# Patient Record
Sex: FEMALE | Race: WHITE | NOT HISPANIC OR LATINO | Employment: OTHER | URBAN - METROPOLITAN AREA
[De-identification: names, ages, dates, MRNs, and addresses within clinical notes are randomized per-mention and may not be internally consistent; named-entity substitution may affect disease eponyms.]

---

## 2022-01-01 ENCOUNTER — APPOINTMENT (OUTPATIENT)
Dept: PHYSICAL THERAPY | Facility: CLINIC | Age: 77
DRG: 193 | End: 2022-01-01
Payer: COMMERCIAL

## 2022-01-01 ENCOUNTER — APPOINTMENT (OUTPATIENT)
Dept: OCCUPATIONAL THERAPY | Facility: CLINIC | Age: 77
DRG: 193 | End: 2022-01-01
Payer: COMMERCIAL

## 2022-01-01 ENCOUNTER — APPOINTMENT (OUTPATIENT)
Dept: OCCUPATIONAL THERAPY | Facility: CLINIC | Age: 77
DRG: 193 | End: 2022-01-01
Attending: INTERNAL MEDICINE
Payer: COMMERCIAL

## 2022-01-01 ENCOUNTER — APPOINTMENT (OUTPATIENT)
Dept: PHYSICAL THERAPY | Facility: CLINIC | Age: 77
DRG: 193 | End: 2022-01-01
Attending: INTERNAL MEDICINE
Payer: COMMERCIAL

## 2022-01-01 ENCOUNTER — LAB REQUISITION (OUTPATIENT)
Dept: LAB | Facility: CLINIC | Age: 77
End: 2022-01-01
Payer: COMMERCIAL

## 2022-01-01 ENCOUNTER — HOSPITAL ENCOUNTER (INPATIENT)
Facility: CLINIC | Age: 77
LOS: 17 days | Discharge: HOME | DRG: 193 | End: 2022-08-04
Attending: EMERGENCY MEDICINE | Admitting: INTERNAL MEDICINE
Payer: COMMERCIAL

## 2022-01-01 ENCOUNTER — APPOINTMENT (OUTPATIENT)
Dept: GENERAL RADIOLOGY | Facility: CLINIC | Age: 77
DRG: 193 | End: 2022-01-01
Attending: EMERGENCY MEDICINE
Payer: COMMERCIAL

## 2022-01-01 VITALS
HEART RATE: 75 BPM | OXYGEN SATURATION: 97 % | WEIGHT: 109.9 LBS | DIASTOLIC BLOOD PRESSURE: 66 MMHG | RESPIRATION RATE: 15 BRPM | BODY MASS INDEX: 21.58 KG/M2 | TEMPERATURE: 98.7 F | HEIGHT: 60 IN | SYSTOLIC BLOOD PRESSURE: 125 MMHG

## 2022-01-01 DIAGNOSIS — D64.9 ANEMIA, UNSPECIFIED TYPE: ICD-10-CM

## 2022-01-01 DIAGNOSIS — R79.9 ABNORMAL FINDING OF BLOOD CHEMISTRY, UNSPECIFIED: ICD-10-CM

## 2022-01-01 DIAGNOSIS — J18.9 PNEUMONIA OF RIGHT LOWER LOBE DUE TO INFECTIOUS ORGANISM: ICD-10-CM

## 2022-01-01 DIAGNOSIS — R68.89 OTHER GENERAL SYMPTOMS AND SIGNS: ICD-10-CM

## 2022-01-01 DIAGNOSIS — Z79.4 LONG TERM CURRENT USE OF INSULIN (H): ICD-10-CM

## 2022-01-01 DIAGNOSIS — R41.0 CONFUSION: Primary | ICD-10-CM

## 2022-01-01 DIAGNOSIS — E10.65 UNCONTROLLED TYPE 1 DIABETES MELLITUS WITH HYPERGLYCEMIA (H): ICD-10-CM

## 2022-01-01 DIAGNOSIS — Z20.822 CONTACT WITH AND (SUSPECTED) EXPOSURE TO COVID-19: ICD-10-CM

## 2022-01-01 LAB
ANION GAP SERPL CALCULATED.3IONS-SCNC: 3 MMOL/L (ref 3–14)
ANION GAP SERPL CALCULATED.3IONS-SCNC: 4 MMOL/L (ref 3–14)
ANION GAP SERPL CALCULATED.3IONS-SCNC: 4 MMOL/L (ref 3–14)
ANION GAP SERPL CALCULATED.3IONS-SCNC: 5 MMOL/L (ref 3–14)
ANION GAP SERPL CALCULATED.3IONS-SCNC: 7 MMOL/L (ref 3–14)
ANION GAP SERPL CALCULATED.3IONS-SCNC: 7 MMOL/L (ref 3–14)
ANION GAP SERPL CALCULATED.3IONS-SCNC: 9 MMOL/L (ref 3–14)
ANION GAP SERPL CALCULATED.3IONS-SCNC: 9 MMOL/L (ref 3–14)
ATRIAL RATE - MUSE: 96 BPM
BASE EXCESS BLDV CALC-SCNC: 3.6 MMOL/L (ref -7.7–1.9)
BASOPHILS # BLD AUTO: 0 10E3/UL (ref 0–0.2)
BASOPHILS # BLD AUTO: 0.1 10E3/UL (ref 0–0.2)
BASOPHILS # BLD MANUAL: 0 10E3/UL (ref 0–0.2)
BASOPHILS # BLD MANUAL: 0 10E3/UL (ref 0–0.2)
BASOPHILS NFR BLD AUTO: 0 %
BASOPHILS NFR BLD AUTO: 0 %
BASOPHILS NFR BLD AUTO: 1 %
BASOPHILS NFR BLD MANUAL: 0 %
BASOPHILS NFR BLD MANUAL: 0 %
BUN SERPL-MCNC: 16 MG/DL (ref 7–30)
BUN SERPL-MCNC: 18 MG/DL (ref 7–30)
BUN SERPL-MCNC: 19 MG/DL (ref 7–30)
BUN SERPL-MCNC: 20 MG/DL (ref 7–30)
BUN SERPL-MCNC: 21 MG/DL (ref 7–30)
BUN SERPL-MCNC: 23 MG/DL (ref 7–30)
BUN SERPL-MCNC: 25 MG/DL (ref 7–30)
BUN SERPL-MCNC: 29 MG/DL (ref 7–30)
BUN SERPL-MCNC: 30 MG/DL (ref 7–30)
BUN SERPL-MCNC: 34 MG/DL (ref 7–30)
BUN SERPL-MCNC: 37 MG/DL (ref 7–30)
CALCIUM SERPL-MCNC: 8.5 MG/DL (ref 8.5–10.1)
CALCIUM SERPL-MCNC: 8.5 MG/DL (ref 8.5–10.1)
CALCIUM SERPL-MCNC: 8.7 MG/DL (ref 8.5–10.1)
CALCIUM SERPL-MCNC: 8.8 MG/DL (ref 8.5–10.1)
CALCIUM SERPL-MCNC: 8.9 MG/DL (ref 8.5–10.1)
CALCIUM SERPL-MCNC: 9.1 MG/DL (ref 8.5–10.1)
CHLORIDE BLD-SCNC: 104 MMOL/L (ref 94–109)
CHLORIDE BLD-SCNC: 107 MMOL/L (ref 94–109)
CHLORIDE BLD-SCNC: 108 MMOL/L (ref 94–109)
CHLORIDE BLD-SCNC: 109 MMOL/L (ref 94–109)
CHLORIDE BLD-SCNC: 110 MMOL/L (ref 94–109)
CHLORIDE BLD-SCNC: 97 MMOL/L (ref 94–109)
CHLORIDE BLD-SCNC: 98 MMOL/L (ref 94–109)
CO2 SERPL-SCNC: 23 MMOL/L (ref 20–32)
CO2 SERPL-SCNC: 25 MMOL/L (ref 20–32)
CO2 SERPL-SCNC: 26 MMOL/L (ref 20–32)
CO2 SERPL-SCNC: 26 MMOL/L (ref 20–32)
CO2 SERPL-SCNC: 28 MMOL/L (ref 20–32)
CO2 SERPL-SCNC: 29 MMOL/L (ref 20–32)
CO2 SERPL-SCNC: 29 MMOL/L (ref 20–32)
CREAT SERPL-MCNC: 0.66 MG/DL (ref 0.52–1.04)
CREAT SERPL-MCNC: 0.68 MG/DL (ref 0.52–1.04)
CREAT SERPL-MCNC: 0.69 MG/DL (ref 0.52–1.04)
CREAT SERPL-MCNC: 0.7 MG/DL (ref 0.52–1.04)
CREAT SERPL-MCNC: 0.7 MG/DL (ref 0.52–1.04)
CREAT SERPL-MCNC: 0.72 MG/DL (ref 0.52–1.04)
CREAT SERPL-MCNC: 0.73 MG/DL (ref 0.52–1.04)
CREAT SERPL-MCNC: 0.73 MG/DL (ref 0.52–1.04)
CREAT SERPL-MCNC: 0.78 MG/DL (ref 0.52–1.04)
CREAT SERPL-MCNC: 1.25 MG/DL (ref 0.52–1.04)
CREAT SERPL-MCNC: 1.32 MG/DL (ref 0.52–1.04)
DIASTOLIC BLOOD PRESSURE - MUSE: NORMAL MMHG
EOSINOPHIL # BLD AUTO: 0.1 10E3/UL (ref 0–0.7)
EOSINOPHIL # BLD AUTO: 0.2 10E3/UL (ref 0–0.7)
EOSINOPHIL # BLD MANUAL: 0 10E3/UL (ref 0–0.7)
EOSINOPHIL # BLD MANUAL: 0.3 10E3/UL (ref 0–0.7)
EOSINOPHIL NFR BLD AUTO: 1 %
EOSINOPHIL NFR BLD AUTO: 2 %
EOSINOPHIL NFR BLD AUTO: 3 %
EOSINOPHIL NFR BLD MANUAL: 0 %
EOSINOPHIL NFR BLD MANUAL: 4 %
ERYTHROCYTE [DISTWIDTH] IN BLOOD BY AUTOMATED COUNT: 12.9 % (ref 10–15)
ERYTHROCYTE [DISTWIDTH] IN BLOOD BY AUTOMATED COUNT: 13.1 % (ref 10–15)
ERYTHROCYTE [DISTWIDTH] IN BLOOD BY AUTOMATED COUNT: 13.2 % (ref 10–15)
ERYTHROCYTE [DISTWIDTH] IN BLOOD BY AUTOMATED COUNT: 13.3 % (ref 10–15)
ERYTHROCYTE [DISTWIDTH] IN BLOOD BY AUTOMATED COUNT: 13.5 % (ref 10–15)
ERYTHROCYTE [DISTWIDTH] IN BLOOD BY AUTOMATED COUNT: 13.8 % (ref 10–15)
ERYTHROCYTE [DISTWIDTH] IN BLOOD BY AUTOMATED COUNT: 14 % (ref 10–15)
ERYTHROCYTE [DISTWIDTH] IN BLOOD BY AUTOMATED COUNT: 14.2 % (ref 10–15)
ERYTHROCYTE [DISTWIDTH] IN BLOOD BY AUTOMATED COUNT: 14.4 % (ref 10–15)
FLUAV RNA SPEC QL NAA+PROBE: NEGATIVE
FLUBV RNA RESP QL NAA+PROBE: NEGATIVE
FRAGMENTS BLD QL SMEAR: SLIGHT
GFR SERPL CREATININE-BSD FRML MDRD: 41 ML/MIN/1.73M2
GFR SERPL CREATININE-BSD FRML MDRD: 44 ML/MIN/1.73M2
GFR SERPL CREATININE-BSD FRML MDRD: 78 ML/MIN/1.73M2
GFR SERPL CREATININE-BSD FRML MDRD: 84 ML/MIN/1.73M2
GFR SERPL CREATININE-BSD FRML MDRD: 84 ML/MIN/1.73M2
GFR SERPL CREATININE-BSD FRML MDRD: 86 ML/MIN/1.73M2
GFR SERPL CREATININE-BSD FRML MDRD: 89 ML/MIN/1.73M2
GFR SERPL CREATININE-BSD FRML MDRD: 90 ML/MIN/1.73M2
GLUCOSE BLD-MCNC: 150 MG/DL (ref 70–99)
GLUCOSE BLD-MCNC: 152 MG/DL (ref 70–99)
GLUCOSE BLD-MCNC: 166 MG/DL (ref 70–99)
GLUCOSE BLD-MCNC: 181 MG/DL (ref 70–99)
GLUCOSE BLD-MCNC: 185 MG/DL (ref 70–99)
GLUCOSE BLD-MCNC: 194 MG/DL (ref 70–99)
GLUCOSE BLD-MCNC: 262 MG/DL (ref 70–99)
GLUCOSE BLD-MCNC: 42 MG/DL (ref 70–99)
GLUCOSE BLD-MCNC: 449 MG/DL (ref 70–99)
GLUCOSE BLD-MCNC: 451 MG/DL (ref 70–99)
GLUCOSE BLD-MCNC: 553 MG/DL (ref 70–99)
GLUCOSE BLDC GLUCOMTR-MCNC: 102 MG/DL (ref 70–99)
GLUCOSE BLDC GLUCOMTR-MCNC: 104 MG/DL (ref 70–99)
GLUCOSE BLDC GLUCOMTR-MCNC: 104 MG/DL (ref 70–99)
GLUCOSE BLDC GLUCOMTR-MCNC: 107 MG/DL (ref 70–99)
GLUCOSE BLDC GLUCOMTR-MCNC: 108 MG/DL (ref 70–99)
GLUCOSE BLDC GLUCOMTR-MCNC: 111 MG/DL (ref 70–99)
GLUCOSE BLDC GLUCOMTR-MCNC: 112 MG/DL (ref 70–99)
GLUCOSE BLDC GLUCOMTR-MCNC: 114 MG/DL (ref 70–99)
GLUCOSE BLDC GLUCOMTR-MCNC: 116 MG/DL (ref 70–99)
GLUCOSE BLDC GLUCOMTR-MCNC: 117 MG/DL (ref 70–99)
GLUCOSE BLDC GLUCOMTR-MCNC: 121 MG/DL (ref 70–99)
GLUCOSE BLDC GLUCOMTR-MCNC: 121 MG/DL (ref 70–99)
GLUCOSE BLDC GLUCOMTR-MCNC: 124 MG/DL (ref 70–99)
GLUCOSE BLDC GLUCOMTR-MCNC: 126 MG/DL (ref 70–99)
GLUCOSE BLDC GLUCOMTR-MCNC: 127 MG/DL (ref 70–99)
GLUCOSE BLDC GLUCOMTR-MCNC: 128 MG/DL (ref 70–99)
GLUCOSE BLDC GLUCOMTR-MCNC: 130 MG/DL (ref 70–99)
GLUCOSE BLDC GLUCOMTR-MCNC: 138 MG/DL (ref 70–99)
GLUCOSE BLDC GLUCOMTR-MCNC: 140 MG/DL (ref 70–99)
GLUCOSE BLDC GLUCOMTR-MCNC: 146 MG/DL (ref 70–99)
GLUCOSE BLDC GLUCOMTR-MCNC: 147 MG/DL (ref 70–99)
GLUCOSE BLDC GLUCOMTR-MCNC: 149 MG/DL (ref 70–99)
GLUCOSE BLDC GLUCOMTR-MCNC: 151 MG/DL (ref 70–99)
GLUCOSE BLDC GLUCOMTR-MCNC: 152 MG/DL (ref 70–99)
GLUCOSE BLDC GLUCOMTR-MCNC: 153 MG/DL (ref 70–99)
GLUCOSE BLDC GLUCOMTR-MCNC: 157 MG/DL (ref 70–99)
GLUCOSE BLDC GLUCOMTR-MCNC: 158 MG/DL (ref 70–99)
GLUCOSE BLDC GLUCOMTR-MCNC: 161 MG/DL (ref 70–99)
GLUCOSE BLDC GLUCOMTR-MCNC: 175 MG/DL (ref 70–99)
GLUCOSE BLDC GLUCOMTR-MCNC: 176 MG/DL (ref 70–99)
GLUCOSE BLDC GLUCOMTR-MCNC: 177 MG/DL (ref 70–99)
GLUCOSE BLDC GLUCOMTR-MCNC: 178 MG/DL (ref 70–99)
GLUCOSE BLDC GLUCOMTR-MCNC: 178 MG/DL (ref 70–99)
GLUCOSE BLDC GLUCOMTR-MCNC: 179 MG/DL (ref 70–99)
GLUCOSE BLDC GLUCOMTR-MCNC: 186 MG/DL (ref 70–99)
GLUCOSE BLDC GLUCOMTR-MCNC: 189 MG/DL (ref 70–99)
GLUCOSE BLDC GLUCOMTR-MCNC: 190 MG/DL (ref 70–99)
GLUCOSE BLDC GLUCOMTR-MCNC: 193 MG/DL (ref 70–99)
GLUCOSE BLDC GLUCOMTR-MCNC: 193 MG/DL (ref 70–99)
GLUCOSE BLDC GLUCOMTR-MCNC: 195 MG/DL (ref 70–99)
GLUCOSE BLDC GLUCOMTR-MCNC: 198 MG/DL (ref 70–99)
GLUCOSE BLDC GLUCOMTR-MCNC: 201 MG/DL (ref 70–99)
GLUCOSE BLDC GLUCOMTR-MCNC: 208 MG/DL (ref 70–99)
GLUCOSE BLDC GLUCOMTR-MCNC: 210 MG/DL (ref 70–99)
GLUCOSE BLDC GLUCOMTR-MCNC: 211 MG/DL (ref 70–99)
GLUCOSE BLDC GLUCOMTR-MCNC: 211 MG/DL (ref 70–99)
GLUCOSE BLDC GLUCOMTR-MCNC: 214 MG/DL (ref 70–99)
GLUCOSE BLDC GLUCOMTR-MCNC: 215 MG/DL (ref 70–99)
GLUCOSE BLDC GLUCOMTR-MCNC: 216 MG/DL (ref 70–99)
GLUCOSE BLDC GLUCOMTR-MCNC: 218 MG/DL (ref 70–99)
GLUCOSE BLDC GLUCOMTR-MCNC: 224 MG/DL (ref 70–99)
GLUCOSE BLDC GLUCOMTR-MCNC: 230 MG/DL (ref 70–99)
GLUCOSE BLDC GLUCOMTR-MCNC: 230 MG/DL (ref 70–99)
GLUCOSE BLDC GLUCOMTR-MCNC: 235 MG/DL (ref 70–99)
GLUCOSE BLDC GLUCOMTR-MCNC: 240 MG/DL (ref 70–99)
GLUCOSE BLDC GLUCOMTR-MCNC: 240 MG/DL (ref 70–99)
GLUCOSE BLDC GLUCOMTR-MCNC: 244 MG/DL (ref 70–99)
GLUCOSE BLDC GLUCOMTR-MCNC: 245 MG/DL (ref 70–99)
GLUCOSE BLDC GLUCOMTR-MCNC: 248 MG/DL (ref 70–99)
GLUCOSE BLDC GLUCOMTR-MCNC: 248 MG/DL (ref 70–99)
GLUCOSE BLDC GLUCOMTR-MCNC: 249 MG/DL (ref 70–99)
GLUCOSE BLDC GLUCOMTR-MCNC: 254 MG/DL (ref 70–99)
GLUCOSE BLDC GLUCOMTR-MCNC: 255 MG/DL (ref 70–99)
GLUCOSE BLDC GLUCOMTR-MCNC: 256 MG/DL (ref 70–99)
GLUCOSE BLDC GLUCOMTR-MCNC: 256 MG/DL (ref 70–99)
GLUCOSE BLDC GLUCOMTR-MCNC: 259 MG/DL (ref 70–99)
GLUCOSE BLDC GLUCOMTR-MCNC: 259 MG/DL (ref 70–99)
GLUCOSE BLDC GLUCOMTR-MCNC: 260 MG/DL (ref 70–99)
GLUCOSE BLDC GLUCOMTR-MCNC: 262 MG/DL (ref 70–99)
GLUCOSE BLDC GLUCOMTR-MCNC: 266 MG/DL (ref 70–99)
GLUCOSE BLDC GLUCOMTR-MCNC: 271 MG/DL (ref 70–99)
GLUCOSE BLDC GLUCOMTR-MCNC: 274 MG/DL (ref 70–99)
GLUCOSE BLDC GLUCOMTR-MCNC: 280 MG/DL (ref 70–99)
GLUCOSE BLDC GLUCOMTR-MCNC: 280 MG/DL (ref 70–99)
GLUCOSE BLDC GLUCOMTR-MCNC: 281 MG/DL (ref 70–99)
GLUCOSE BLDC GLUCOMTR-MCNC: 281 MG/DL (ref 70–99)
GLUCOSE BLDC GLUCOMTR-MCNC: 288 MG/DL (ref 70–99)
GLUCOSE BLDC GLUCOMTR-MCNC: 289 MG/DL (ref 70–99)
GLUCOSE BLDC GLUCOMTR-MCNC: 29 MG/DL (ref 70–99)
GLUCOSE BLDC GLUCOMTR-MCNC: 290 MG/DL (ref 70–99)
GLUCOSE BLDC GLUCOMTR-MCNC: 295 MG/DL (ref 70–99)
GLUCOSE BLDC GLUCOMTR-MCNC: 301 MG/DL (ref 70–99)
GLUCOSE BLDC GLUCOMTR-MCNC: 301 MG/DL (ref 70–99)
GLUCOSE BLDC GLUCOMTR-MCNC: 304 MG/DL (ref 70–99)
GLUCOSE BLDC GLUCOMTR-MCNC: 305 MG/DL (ref 70–99)
GLUCOSE BLDC GLUCOMTR-MCNC: 312 MG/DL (ref 70–99)
GLUCOSE BLDC GLUCOMTR-MCNC: 313 MG/DL (ref 70–99)
GLUCOSE BLDC GLUCOMTR-MCNC: 314 MG/DL (ref 70–99)
GLUCOSE BLDC GLUCOMTR-MCNC: 326 MG/DL (ref 70–99)
GLUCOSE BLDC GLUCOMTR-MCNC: 326 MG/DL (ref 70–99)
GLUCOSE BLDC GLUCOMTR-MCNC: 340 MG/DL (ref 70–99)
GLUCOSE BLDC GLUCOMTR-MCNC: 342 MG/DL (ref 70–99)
GLUCOSE BLDC GLUCOMTR-MCNC: 35 MG/DL (ref 70–99)
GLUCOSE BLDC GLUCOMTR-MCNC: 361 MG/DL (ref 70–99)
GLUCOSE BLDC GLUCOMTR-MCNC: 37 MG/DL (ref 70–99)
GLUCOSE BLDC GLUCOMTR-MCNC: 379 MG/DL (ref 70–99)
GLUCOSE BLDC GLUCOMTR-MCNC: 393 MG/DL (ref 70–99)
GLUCOSE BLDC GLUCOMTR-MCNC: 404 MG/DL (ref 70–99)
GLUCOSE BLDC GLUCOMTR-MCNC: 406 MG/DL (ref 70–99)
GLUCOSE BLDC GLUCOMTR-MCNC: 41 MG/DL (ref 70–99)
GLUCOSE BLDC GLUCOMTR-MCNC: 418 MG/DL (ref 70–99)
GLUCOSE BLDC GLUCOMTR-MCNC: 432 MG/DL (ref 70–99)
GLUCOSE BLDC GLUCOMTR-MCNC: 442 MG/DL (ref 70–99)
GLUCOSE BLDC GLUCOMTR-MCNC: 45 MG/DL (ref 70–99)
GLUCOSE BLDC GLUCOMTR-MCNC: 468 MG/DL (ref 70–99)
GLUCOSE BLDC GLUCOMTR-MCNC: 49 MG/DL (ref 70–99)
GLUCOSE BLDC GLUCOMTR-MCNC: 51 MG/DL (ref 70–99)
GLUCOSE BLDC GLUCOMTR-MCNC: 529 MG/DL (ref 70–99)
GLUCOSE BLDC GLUCOMTR-MCNC: 56 MG/DL (ref 70–99)
GLUCOSE BLDC GLUCOMTR-MCNC: 59 MG/DL (ref 70–99)
GLUCOSE BLDC GLUCOMTR-MCNC: 60 MG/DL (ref 70–99)
GLUCOSE BLDC GLUCOMTR-MCNC: 63 MG/DL (ref 70–99)
GLUCOSE BLDC GLUCOMTR-MCNC: 66 MG/DL (ref 70–99)
GLUCOSE BLDC GLUCOMTR-MCNC: 69 MG/DL (ref 70–99)
GLUCOSE BLDC GLUCOMTR-MCNC: 74 MG/DL (ref 70–99)
GLUCOSE BLDC GLUCOMTR-MCNC: 76 MG/DL (ref 70–99)
GLUCOSE BLDC GLUCOMTR-MCNC: 77 MG/DL (ref 70–99)
GLUCOSE BLDC GLUCOMTR-MCNC: 79 MG/DL (ref 70–99)
GLUCOSE BLDC GLUCOMTR-MCNC: 80 MG/DL (ref 70–99)
GLUCOSE BLDC GLUCOMTR-MCNC: 89 MG/DL (ref 70–99)
HBA1C MFR BLD: 10 % (ref 0–5.6)
HCO3 BLDV-SCNC: 30 MMOL/L (ref 21–28)
HCT VFR BLD AUTO: 24.2 % (ref 35–47)
HCT VFR BLD AUTO: 25.1 % (ref 35–47)
HCT VFR BLD AUTO: 26.1 % (ref 35–47)
HCT VFR BLD AUTO: 26.4 % (ref 35–47)
HCT VFR BLD AUTO: 26.9 % (ref 35–47)
HCT VFR BLD AUTO: 27 % (ref 35–47)
HCT VFR BLD AUTO: 27.1 % (ref 35–47)
HCT VFR BLD AUTO: 27.5 % (ref 35–47)
HCT VFR BLD AUTO: 27.7 % (ref 35–47)
HGB BLD-MCNC: 8.2 G/DL (ref 11.7–15.7)
HGB BLD-MCNC: 8.4 G/DL (ref 11.7–15.7)
HGB BLD-MCNC: 8.4 G/DL (ref 11.7–15.7)
HGB BLD-MCNC: 8.6 G/DL (ref 11.7–15.7)
HGB BLD-MCNC: 8.7 G/DL (ref 11.7–15.7)
HGB BLD-MCNC: 8.8 G/DL (ref 11.7–15.7)
HGB BLD-MCNC: 8.9 G/DL (ref 11.7–15.7)
HOLD SPECIMEN: NORMAL
IMM GRANULOCYTES # BLD: 0 10E3/UL
IMM GRANULOCYTES # BLD: 0.1 10E3/UL
IMM GRANULOCYTES # BLD: 0.2 10E3/UL
IMM GRANULOCYTES # BLD: 0.3 10E3/UL
IMM GRANULOCYTES NFR BLD: 0 %
IMM GRANULOCYTES NFR BLD: 1 %
IMM GRANULOCYTES NFR BLD: 3 %
IMM GRANULOCYTES NFR BLD: 3 %
IMM GRANULOCYTES NFR BLD: 4 %
IMM GRANULOCYTES NFR BLD: 4 %
INTERPRETATION ECG - MUSE: NORMAL
KETONES BLD-SCNC: 0.4 MMOL/L (ref 0–0.6)
LYMPHOCYTES # BLD AUTO: 1 10E3/UL (ref 0.8–5.3)
LYMPHOCYTES # BLD AUTO: 1.4 10E3/UL (ref 0.8–5.3)
LYMPHOCYTES # BLD AUTO: 1.4 10E3/UL (ref 0.8–5.3)
LYMPHOCYTES # BLD AUTO: 1.6 10E3/UL (ref 0.8–5.3)
LYMPHOCYTES # BLD AUTO: 1.6 10E3/UL (ref 0.8–5.3)
LYMPHOCYTES # BLD AUTO: 1.7 10E3/UL (ref 0.8–5.3)
LYMPHOCYTES # BLD MANUAL: 0.7 10E3/UL (ref 0.8–5.3)
LYMPHOCYTES # BLD MANUAL: 1.5 10E3/UL (ref 0.8–5.3)
LYMPHOCYTES NFR BLD AUTO: 15 %
LYMPHOCYTES NFR BLD AUTO: 18 %
LYMPHOCYTES NFR BLD AUTO: 18 %
LYMPHOCYTES NFR BLD AUTO: 21 %
LYMPHOCYTES NFR BLD AUTO: 22 %
LYMPHOCYTES NFR BLD AUTO: 25 %
LYMPHOCYTES NFR BLD MANUAL: 20 %
LYMPHOCYTES NFR BLD MANUAL: 9 %
MCH RBC QN AUTO: 30.1 PG (ref 26.5–33)
MCH RBC QN AUTO: 30.3 PG (ref 26.5–33)
MCH RBC QN AUTO: 30.4 PG (ref 26.5–33)
MCH RBC QN AUTO: 30.5 PG (ref 26.5–33)
MCH RBC QN AUTO: 30.9 PG (ref 26.5–33)
MCH RBC QN AUTO: 30.9 PG (ref 26.5–33)
MCH RBC QN AUTO: 31.3 PG (ref 26.5–33)
MCHC RBC AUTO-ENTMCNC: 31.3 G/DL (ref 31.5–36.5)
MCHC RBC AUTO-ENTMCNC: 32.1 G/DL (ref 31.5–36.5)
MCHC RBC AUTO-ENTMCNC: 32.1 G/DL (ref 31.5–36.5)
MCHC RBC AUTO-ENTMCNC: 32.2 G/DL (ref 31.5–36.5)
MCHC RBC AUTO-ENTMCNC: 32.2 G/DL (ref 31.5–36.5)
MCHC RBC AUTO-ENTMCNC: 32.7 G/DL (ref 31.5–36.5)
MCHC RBC AUTO-ENTMCNC: 32.7 G/DL (ref 31.5–36.5)
MCHC RBC AUTO-ENTMCNC: 33 G/DL (ref 31.5–36.5)
MCHC RBC AUTO-ENTMCNC: 34.7 G/DL (ref 31.5–36.5)
MCV RBC AUTO: 90 FL (ref 78–100)
MCV RBC AUTO: 93 FL (ref 78–100)
MCV RBC AUTO: 93 FL (ref 78–100)
MCV RBC AUTO: 94 FL (ref 78–100)
MCV RBC AUTO: 95 FL (ref 78–100)
MCV RBC AUTO: 96 FL (ref 78–100)
MCV RBC AUTO: 96 FL (ref 78–100)
METAMYELOCYTES # BLD MANUAL: 0.1 10E3/UL
METAMYELOCYTES NFR BLD MANUAL: 1 %
MONOCYTES # BLD AUTO: 0.4 10E3/UL (ref 0–1.3)
MONOCYTES # BLD AUTO: 0.5 10E3/UL (ref 0–1.3)
MONOCYTES # BLD AUTO: 0.6 10E3/UL (ref 0–1.3)
MONOCYTES # BLD MANUAL: 0.3 10E3/UL (ref 0–1.3)
MONOCYTES # BLD MANUAL: 0.5 10E3/UL (ref 0–1.3)
MONOCYTES NFR BLD AUTO: 6 %
MONOCYTES NFR BLD AUTO: 7 %
MONOCYTES NFR BLD AUTO: 7 %
MONOCYTES NFR BLD AUTO: 8 %
MONOCYTES NFR BLD AUTO: 9 %
MONOCYTES NFR BLD AUTO: 9 %
MONOCYTES NFR BLD MANUAL: 4 %
MONOCYTES NFR BLD MANUAL: 7 %
MRSA DNA SPEC QL NAA+PROBE: NEGATIVE
MYELOCYTES # BLD MANUAL: 0.1 10E3/UL
MYELOCYTES # BLD MANUAL: 0.1 10E3/UL
MYELOCYTES NFR BLD MANUAL: 1 %
MYELOCYTES NFR BLD MANUAL: 2 %
NEUTROPHILS # BLD AUTO: 4.5 10E3/UL (ref 1.6–8.3)
NEUTROPHILS # BLD AUTO: 4.5 10E3/UL (ref 1.6–8.3)
NEUTROPHILS # BLD AUTO: 5 10E3/UL (ref 1.6–8.3)
NEUTROPHILS # BLD AUTO: 5 10E3/UL (ref 1.6–8.3)
NEUTROPHILS # BLD AUTO: 5.2 10E3/UL (ref 1.6–8.3)
NEUTROPHILS # BLD AUTO: 5.2 10E3/UL (ref 1.6–8.3)
NEUTROPHILS # BLD MANUAL: 5.2 10E3/UL (ref 1.6–8.3)
NEUTROPHILS # BLD MANUAL: 6 10E3/UL (ref 1.6–8.3)
NEUTROPHILS NFR BLD AUTO: 61 %
NEUTROPHILS NFR BLD AUTO: 64 %
NEUTROPHILS NFR BLD AUTO: 65 %
NEUTROPHILS NFR BLD AUTO: 68 %
NEUTROPHILS NFR BLD AUTO: 68 %
NEUTROPHILS NFR BLD AUTO: 75 %
NEUTROPHILS NFR BLD MANUAL: 70 %
NEUTROPHILS NFR BLD MANUAL: 82 %
NRBC # BLD AUTO: 0 10E3/UL
NRBC BLD AUTO-RTO: 0 /100
NRBC BLD AUTO-RTO: 1 /100
O2/TOTAL GAS SETTING VFR VENT: 21 %
P AXIS - MUSE: 55 DEGREES
PCO2 BLDV: 51 MM HG (ref 40–50)
PH BLDV: 7.38 [PH] (ref 7.32–7.43)
PLAT MORPH BLD: ABNORMAL
PLAT MORPH BLD: ABNORMAL
PLATELET # BLD AUTO: 230 10E3/UL (ref 150–450)
PLATELET # BLD AUTO: 261 10E3/UL (ref 150–450)
PLATELET # BLD AUTO: 265 10E3/UL (ref 150–450)
PLATELET # BLD AUTO: 268 10E3/UL (ref 150–450)
PLATELET # BLD AUTO: 277 10E3/UL (ref 150–450)
PLATELET # BLD AUTO: 290 10E3/UL (ref 150–450)
PLATELET # BLD AUTO: 299 10E3/UL (ref 150–450)
PLATELET # BLD AUTO: 330 10E3/UL (ref 150–450)
PLATELET # BLD AUTO: 334 10E3/UL (ref 150–450)
PLATELET # BLD AUTO: 341 10E3/UL (ref 150–450)
PLATELET # BLD AUTO: 356 10E3/UL (ref 150–450)
PO2 BLDV: 17 MM HG (ref 25–47)
POLYCHROMASIA BLD QL SMEAR: SLIGHT
POTASSIUM BLD-SCNC: 3.7 MMOL/L (ref 3.4–5.3)
POTASSIUM BLD-SCNC: 3.8 MMOL/L (ref 3.4–5.3)
POTASSIUM BLD-SCNC: 3.9 MMOL/L (ref 3.4–5.3)
POTASSIUM BLD-SCNC: 3.9 MMOL/L (ref 3.4–5.3)
POTASSIUM BLD-SCNC: 4 MMOL/L (ref 3.4–5.3)
POTASSIUM BLD-SCNC: 4 MMOL/L (ref 3.4–5.3)
POTASSIUM BLD-SCNC: 4.1 MMOL/L (ref 3.4–5.3)
POTASSIUM BLD-SCNC: 4.2 MMOL/L (ref 3.4–5.3)
POTASSIUM BLD-SCNC: 4.5 MMOL/L (ref 3.4–5.3)
POTASSIUM BLD-SCNC: 4.6 MMOL/L (ref 3.4–5.3)
POTASSIUM BLD-SCNC: 4.6 MMOL/L (ref 3.4–5.3)
PR INTERVAL - MUSE: 146 MS
QRS DURATION - MUSE: 74 MS
QT - MUSE: 350 MS
QTC - MUSE: 442 MS
R AXIS - MUSE: 55 DEGREES
RBC # BLD AUTO: 2.68 10E6/UL (ref 3.8–5.2)
RBC # BLD AUTO: 2.7 10E6/UL (ref 3.8–5.2)
RBC # BLD AUTO: 2.72 10E6/UL (ref 3.8–5.2)
RBC # BLD AUTO: 2.82 10E6/UL (ref 3.8–5.2)
RBC # BLD AUTO: 2.85 10E6/UL (ref 3.8–5.2)
RBC # BLD AUTO: 2.86 10E6/UL (ref 3.8–5.2)
RBC # BLD AUTO: 2.86 10E6/UL (ref 3.8–5.2)
RBC # BLD AUTO: 2.89 10E6/UL (ref 3.8–5.2)
RBC # BLD AUTO: 2.94 10E6/UL (ref 3.8–5.2)
RBC MORPH BLD: ABNORMAL
RBC MORPH BLD: ABNORMAL
RSV RNA SPEC NAA+PROBE: NEGATIVE
SA TARGET DNA: NEGATIVE
SARS-COV-2 RNA RESP QL NAA+PROBE: NEGATIVE
SODIUM SERPL-SCNC: 132 MMOL/L (ref 133–144)
SODIUM SERPL-SCNC: 133 MMOL/L (ref 133–144)
SODIUM SERPL-SCNC: 136 MMOL/L (ref 133–144)
SODIUM SERPL-SCNC: 139 MMOL/L (ref 133–144)
SODIUM SERPL-SCNC: 140 MMOL/L (ref 133–144)
SODIUM SERPL-SCNC: 141 MMOL/L (ref 133–144)
SODIUM SERPL-SCNC: 143 MMOL/L (ref 133–144)
SYSTOLIC BLOOD PRESSURE - MUSE: NORMAL MMHG
T AXIS - MUSE: 39 DEGREES
VENTRICULAR RATE- MUSE: 96 BPM
WBC # BLD AUTO: 6.4 10E3/UL (ref 4–11)
WBC # BLD AUTO: 6.7 10E3/UL (ref 4–11)
WBC # BLD AUTO: 7 10E3/UL (ref 4–11)
WBC # BLD AUTO: 7.3 10E3/UL (ref 4–11)
WBC # BLD AUTO: 7.3 10E3/UL (ref 4–11)
WBC # BLD AUTO: 7.4 10E3/UL (ref 4–11)
WBC # BLD AUTO: 7.5 10E3/UL (ref 4–11)
WBC # BLD AUTO: 7.7 10E3/UL (ref 4–11)
WBC # BLD AUTO: 7.8 10E3/UL (ref 4–11)

## 2022-01-01 PROCEDURE — 97116 GAIT TRAINING THERAPY: CPT | Mod: GP

## 2022-01-01 PROCEDURE — 250N000013 HC RX MED GY IP 250 OP 250 PS 637: Performed by: INTERNAL MEDICINE

## 2022-01-01 PROCEDURE — 200N000002 HC R&B ICU UMMC

## 2022-01-01 PROCEDURE — 999N000157 HC STATISTIC RCP TIME EA 10 MIN

## 2022-01-01 PROCEDURE — 250N000011 HC RX IP 250 OP 636: Performed by: INTERNAL MEDICINE

## 2022-01-01 PROCEDURE — 97110 THERAPEUTIC EXERCISES: CPT | Mod: GP

## 2022-01-01 PROCEDURE — 97535 SELF CARE MNGMENT TRAINING: CPT | Mod: GO

## 2022-01-01 PROCEDURE — 85004 AUTOMATED DIFF WBC COUNT: CPT | Performed by: INTERNAL MEDICINE

## 2022-01-01 PROCEDURE — 99233 SBSQ HOSP IP/OBS HIGH 50: CPT | Performed by: NURSE PRACTITIONER

## 2022-01-01 PROCEDURE — 36415 COLL VENOUS BLD VENIPUNCTURE: CPT | Performed by: INTERNAL MEDICINE

## 2022-01-01 PROCEDURE — 97530 THERAPEUTIC ACTIVITIES: CPT | Mod: GP | Performed by: PHYSICAL THERAPIST

## 2022-01-01 PROCEDURE — 97161 PT EVAL LOW COMPLEX 20 MIN: CPT | Mod: GP | Performed by: PHYSICAL THERAPIST

## 2022-01-01 PROCEDURE — 82565 ASSAY OF CREATININE: CPT | Performed by: INTERNAL MEDICINE

## 2022-01-01 PROCEDURE — 80048 BASIC METABOLIC PNL TOTAL CA: CPT | Performed by: PHYSICIAN ASSISTANT

## 2022-01-01 PROCEDURE — 99232 SBSQ HOSP IP/OBS MODERATE 35: CPT | Performed by: INTERNAL MEDICINE

## 2022-01-01 PROCEDURE — 99233 SBSQ HOSP IP/OBS HIGH 50: CPT | Performed by: INTERNAL MEDICINE

## 2022-01-01 PROCEDURE — 250N000009 HC RX 250: Performed by: PHYSICIAN ASSISTANT

## 2022-01-01 PROCEDURE — 97112 NEUROMUSCULAR REEDUCATION: CPT | Mod: GP

## 2022-01-01 PROCEDURE — 97530 THERAPEUTIC ACTIVITIES: CPT | Mod: GO | Performed by: OCCUPATIONAL THERAPIST

## 2022-01-01 PROCEDURE — 85049 AUTOMATED PLATELET COUNT: CPT | Performed by: INTERNAL MEDICINE

## 2022-01-01 PROCEDURE — 250N000012 HC RX MED GY IP 250 OP 636 PS 637: Performed by: CLINICAL NURSE SPECIALIST

## 2022-01-01 PROCEDURE — 82310 ASSAY OF CALCIUM: CPT | Performed by: INTERNAL MEDICINE

## 2022-01-01 PROCEDURE — 258N000001 HC RX 258: Performed by: PHYSICIAN ASSISTANT

## 2022-01-01 PROCEDURE — 250N000009 HC RX 250: Performed by: CLINICAL NURSE SPECIALIST

## 2022-01-01 PROCEDURE — 99285 EMERGENCY DEPT VISIT HI MDM: CPT | Mod: 25 | Performed by: EMERGENCY MEDICINE

## 2022-01-01 PROCEDURE — 99207 PR NO BILLABLE SERVICE THIS VISIT: CPT | Performed by: INTERNAL MEDICINE

## 2022-01-01 PROCEDURE — 99233 SBSQ HOSP IP/OBS HIGH 50: CPT | Performed by: CLINICAL NURSE SPECIALIST

## 2022-01-01 PROCEDURE — 85025 COMPLETE CBC W/AUTO DIFF WBC: CPT | Performed by: INTERNAL MEDICINE

## 2022-01-01 PROCEDURE — 85027 COMPLETE CBC AUTOMATED: CPT | Performed by: INTERNAL MEDICINE

## 2022-01-01 PROCEDURE — 250N000009 HC RX 250: Performed by: INTERNAL MEDICINE

## 2022-01-01 PROCEDURE — 96365 THER/PROPH/DIAG IV INF INIT: CPT | Performed by: EMERGENCY MEDICINE

## 2022-01-01 PROCEDURE — 97530 THERAPEUTIC ACTIVITIES: CPT | Mod: GP

## 2022-01-01 PROCEDURE — 99231 SBSQ HOSP IP/OBS SF/LOW 25: CPT | Performed by: INTERNAL MEDICINE

## 2022-01-01 PROCEDURE — 85018 HEMOGLOBIN: CPT | Performed by: INTERNAL MEDICINE

## 2022-01-01 PROCEDURE — 85025 COMPLETE CBC W/AUTO DIFF WBC: CPT | Performed by: EMERGENCY MEDICINE

## 2022-01-01 PROCEDURE — 250N000013 HC RX MED GY IP 250 OP 250 PS 637: Performed by: CLINICAL NURSE SPECIALIST

## 2022-01-01 PROCEDURE — 120N000002 HC R&B MED SURG/OB UMMC

## 2022-01-01 PROCEDURE — 93005 ELECTROCARDIOGRAM TRACING: CPT | Performed by: EMERGENCY MEDICINE

## 2022-01-01 PROCEDURE — 258N000003 HC RX IP 258 OP 636: Performed by: EMERGENCY MEDICINE

## 2022-01-01 PROCEDURE — 85007 BL SMEAR W/DIFF WBC COUNT: CPT | Performed by: INTERNAL MEDICINE

## 2022-01-01 PROCEDURE — 99239 HOSP IP/OBS DSCHRG MGMT >30: CPT | Performed by: INTERNAL MEDICINE

## 2022-01-01 PROCEDURE — 97116 GAIT TRAINING THERAPY: CPT | Mod: GP | Performed by: PHYSICAL THERAPIST

## 2022-01-01 PROCEDURE — 87641 MR-STAPH DNA AMP PROBE: CPT | Performed by: INTERNAL MEDICINE

## 2022-01-01 PROCEDURE — 96366 THER/PROPH/DIAG IV INF ADDON: CPT | Performed by: EMERGENCY MEDICINE

## 2022-01-01 PROCEDURE — 96372 THER/PROPH/DIAG INJ SC/IM: CPT | Performed by: EMERGENCY MEDICINE

## 2022-01-01 PROCEDURE — 80048 BASIC METABOLIC PNL TOTAL CA: CPT | Performed by: INTERNAL MEDICINE

## 2022-01-01 PROCEDURE — 36415 COLL VENOUS BLD VENIPUNCTURE: CPT | Performed by: EMERGENCY MEDICINE

## 2022-01-01 PROCEDURE — 97535 SELF CARE MNGMENT TRAINING: CPT | Mod: GO | Performed by: OCCUPATIONAL THERAPIST

## 2022-01-01 PROCEDURE — 36415 COLL VENOUS BLD VENIPUNCTURE: CPT | Performed by: PHYSICIAN ASSISTANT

## 2022-01-01 PROCEDURE — 97110 THERAPEUTIC EXERCISES: CPT | Mod: GO | Performed by: OCCUPATIONAL THERAPIST

## 2022-01-01 PROCEDURE — 250N000012 HC RX MED GY IP 250 OP 636 PS 637: Performed by: EMERGENCY MEDICINE

## 2022-01-01 PROCEDURE — C9803 HOPD COVID-19 SPEC COLLECT: HCPCS | Performed by: EMERGENCY MEDICINE

## 2022-01-01 PROCEDURE — 97165 OT EVAL LOW COMPLEX 30 MIN: CPT | Mod: GO

## 2022-01-01 PROCEDURE — 82803 BLOOD GASES ANY COMBINATION: CPT | Performed by: INTERNAL MEDICINE

## 2022-01-01 PROCEDURE — 99232 SBSQ HOSP IP/OBS MODERATE 35: CPT | Performed by: NURSE PRACTITIONER

## 2022-01-01 PROCEDURE — 97129 THER IVNTJ 1ST 15 MIN: CPT | Mod: GO

## 2022-01-01 PROCEDURE — 99222 1ST HOSP IP/OBS MODERATE 55: CPT | Performed by: CLINICAL NURSE SPECIALIST

## 2022-01-01 PROCEDURE — 258N000001 HC RX 258: Performed by: INTERNAL MEDICINE

## 2022-01-01 PROCEDURE — 87637 SARSCOV2&INF A&B&RSV AMP PRB: CPT | Performed by: EMERGENCY MEDICINE

## 2022-01-01 PROCEDURE — 96361 HYDRATE IV INFUSION ADD-ON: CPT | Performed by: EMERGENCY MEDICINE

## 2022-01-01 PROCEDURE — 71045 X-RAY EXAM CHEST 1 VIEW: CPT

## 2022-01-01 PROCEDURE — 94640 AIRWAY INHALATION TREATMENT: CPT

## 2022-01-01 PROCEDURE — 250N000013 HC RX MED GY IP 250 OP 250 PS 637: Performed by: PHYSICIAN ASSISTANT

## 2022-01-01 PROCEDURE — 250N000011 HC RX IP 250 OP 636: Performed by: EMERGENCY MEDICINE

## 2022-01-01 PROCEDURE — 83036 HEMOGLOBIN GLYCOSYLATED A1C: CPT | Performed by: INTERNAL MEDICINE

## 2022-01-01 PROCEDURE — 82010 KETONE BODYS QUAN: CPT | Performed by: EMERGENCY MEDICINE

## 2022-01-01 PROCEDURE — 97530 THERAPEUTIC ACTIVITIES: CPT | Mod: GO

## 2022-01-01 PROCEDURE — 99222 1ST HOSP IP/OBS MODERATE 55: CPT | Mod: AI | Performed by: INTERNAL MEDICINE

## 2022-01-01 PROCEDURE — 97130 THER IVNTJ EA ADDL 15 MIN: CPT | Mod: GO

## 2022-01-01 PROCEDURE — 93010 ELECTROCARDIOGRAM REPORT: CPT | Performed by: EMERGENCY MEDICINE

## 2022-01-01 PROCEDURE — 80048 BASIC METABOLIC PNL TOTAL CA: CPT | Performed by: EMERGENCY MEDICINE

## 2022-01-01 RX ORDER — NICOTINE POLACRILEX 4 MG
15-30 LOZENGE BUCCAL
Status: DISCONTINUED | OUTPATIENT
Start: 2022-01-01 | End: 2022-01-01

## 2022-01-01 RX ORDER — LIDOCAINE 40 MG/G
CREAM TOPICAL
Status: DISCONTINUED | OUTPATIENT
Start: 2022-01-01 | End: 2022-01-01 | Stop reason: HOSPADM

## 2022-01-01 RX ORDER — NICOTINE POLACRILEX 4 MG
15-30 LOZENGE BUCCAL
Status: DISCONTINUED | OUTPATIENT
Start: 2022-01-01 | End: 2022-01-01 | Stop reason: HOSPADM

## 2022-01-01 RX ORDER — ASPIRIN 81 MG/1
81 TABLET, CHEWABLE ORAL DAILY
Status: DISCONTINUED | OUTPATIENT
Start: 2022-01-01 | End: 2022-01-01 | Stop reason: HOSPADM

## 2022-01-01 RX ORDER — ESCITALOPRAM OXALATE 10 MG/1
10 TABLET ORAL DAILY
Status: DISCONTINUED | OUTPATIENT
Start: 2022-01-01 | End: 2022-01-01 | Stop reason: HOSPADM

## 2022-01-01 RX ORDER — POLYETHYLENE GLYCOL 3350 17 G/17G
17 POWDER, FOR SOLUTION ORAL DAILY
Qty: 510 G | DISCHARGE
Start: 2022-01-01

## 2022-01-01 RX ORDER — ESCITALOPRAM OXALATE 10 MG/1
1 TABLET ORAL DAILY
COMMUNITY
Start: 2021-01-01 | End: 2022-09-11

## 2022-01-01 RX ORDER — QUETIAPINE FUMARATE 25 MG/1
6.25 TABLET, FILM COATED ORAL AT BEDTIME
DISCHARGE
Start: 2022-01-01

## 2022-01-01 RX ORDER — ACETAMINOPHEN 325 MG/1
325 TABLET ORAL EVERY 4 HOURS PRN
Status: DISCONTINUED | OUTPATIENT
Start: 2022-01-01 | End: 2022-01-01 | Stop reason: HOSPADM

## 2022-01-01 RX ORDER — BENZONATATE 100 MG/1
100 CAPSULE ORAL 3 TIMES DAILY PRN
Status: DISCONTINUED | OUTPATIENT
Start: 2022-01-01 | End: 2022-01-01 | Stop reason: HOSPADM

## 2022-01-01 RX ORDER — METOPROLOL SUCCINATE 25 MG/1
25 TABLET, EXTENDED RELEASE ORAL DAILY
Status: DISCONTINUED | OUTPATIENT
Start: 2022-01-01 | End: 2022-01-01 | Stop reason: HOSPADM

## 2022-01-01 RX ORDER — METOPROLOL SUCCINATE 25 MG/1
25 TABLET, EXTENDED RELEASE ORAL DAILY
COMMUNITY

## 2022-01-01 RX ORDER — DEXTROSE MONOHYDRATE 25 G/50ML
25-50 INJECTION, SOLUTION INTRAVENOUS
Status: DISCONTINUED | OUTPATIENT
Start: 2022-01-01 | End: 2022-01-01

## 2022-01-01 RX ORDER — DEXTROSE MONOHYDRATE 25 G/50ML
25-50 INJECTION, SOLUTION INTRAVENOUS
Status: DISCONTINUED | OUTPATIENT
Start: 2022-01-01 | End: 2022-01-01 | Stop reason: HOSPADM

## 2022-01-01 RX ORDER — INSULIN LISPRO 100 [IU]/ML
INJECTION, SOLUTION INTRAVENOUS; SUBCUTANEOUS
Status: ON HOLD | COMMUNITY
Start: 2020-10-22 | End: 2022-01-01

## 2022-01-01 RX ORDER — POLYETHYLENE GLYCOL 3350 17 G/17G
17 POWDER, FOR SOLUTION ORAL DAILY
Status: DISCONTINUED | OUTPATIENT
Start: 2022-01-01 | End: 2022-01-01 | Stop reason: HOSPADM

## 2022-01-01 RX ORDER — ONDANSETRON 4 MG/1
4 TABLET, ORALLY DISINTEGRATING ORAL EVERY 6 HOURS PRN
Status: DISCONTINUED | OUTPATIENT
Start: 2022-01-01 | End: 2022-01-01 | Stop reason: HOSPADM

## 2022-01-01 RX ORDER — ENOXAPARIN SODIUM 100 MG/ML
40 INJECTION SUBCUTANEOUS EVERY 24 HOURS
Status: DISCONTINUED | OUTPATIENT
Start: 2022-01-01 | End: 2022-01-01

## 2022-01-01 RX ORDER — IPRATROPIUM BROMIDE AND ALBUTEROL SULFATE 2.5; .5 MG/3ML; MG/3ML
3 SOLUTION RESPIRATORY (INHALATION) EVERY 4 HOURS PRN
Status: DISCONTINUED | OUTPATIENT
Start: 2022-01-01 | End: 2022-01-01 | Stop reason: HOSPADM

## 2022-01-01 RX ORDER — ONDANSETRON 2 MG/ML
4 INJECTION INTRAMUSCULAR; INTRAVENOUS EVERY 6 HOURS PRN
Status: DISCONTINUED | OUTPATIENT
Start: 2022-01-01 | End: 2022-01-01 | Stop reason: HOSPADM

## 2022-01-01 RX ORDER — BRIMONIDINE TARTRATE AND TIMOLOL MALEATE 2; 5 MG/ML; MG/ML
1 SOLUTION OPHTHALMIC 2 TIMES DAILY
Status: ON HOLD | COMMUNITY
End: 2022-01-01

## 2022-01-01 RX ORDER — ASPIRIN 81 MG/1
81 TABLET, CHEWABLE ORAL DAILY
COMMUNITY

## 2022-01-01 RX ORDER — DEXTROSE MONOHYDRATE 100 MG/ML
INJECTION, SOLUTION INTRAVENOUS CONTINUOUS PRN
Status: DISCONTINUED | OUTPATIENT
Start: 2022-01-01 | End: 2022-01-01 | Stop reason: HOSPADM

## 2022-01-01 RX ORDER — IPRATROPIUM BROMIDE AND ALBUTEROL SULFATE 2.5; .5 MG/3ML; MG/3ML
3 SOLUTION RESPIRATORY (INHALATION) EVERY 4 HOURS PRN
Qty: 90 ML | DISCHARGE
Start: 2022-01-01

## 2022-01-01 RX ORDER — ALPRAZOLAM 0.25 MG
0.25 TABLET ORAL ONCE
Status: COMPLETED | OUTPATIENT
Start: 2022-01-01 | End: 2022-01-01

## 2022-01-01 RX ADMIN — ASPIRIN 81 MG CHEWABLE TABLET 81 MG: 81 TABLET CHEWABLE at 08:46

## 2022-01-01 RX ADMIN — ASPIRIN 81 MG CHEWABLE TABLET 81 MG: 81 TABLET CHEWABLE at 08:02

## 2022-01-01 RX ADMIN — Medication 1 MG: at 21:29

## 2022-01-01 RX ADMIN — ESCITALOPRAM OXALATE 10 MG: 10 TABLET ORAL at 08:58

## 2022-01-01 RX ADMIN — ASPIRIN 81 MG CHEWABLE TABLET 81 MG: 81 TABLET CHEWABLE at 09:45

## 2022-01-01 RX ADMIN — Medication 15 G: at 22:07

## 2022-01-01 RX ADMIN — BENZONATATE 100 MG: 100 CAPSULE ORAL at 15:04

## 2022-01-01 RX ADMIN — INSULIN ASPART 8 UNITS: 100 INJECTION, SOLUTION INTRAVENOUS; SUBCUTANEOUS at 01:35

## 2022-01-01 RX ADMIN — ENOXAPARIN SODIUM 40 MG: 40 INJECTION SUBCUTANEOUS at 13:47

## 2022-01-01 RX ADMIN — ENOXAPARIN SODIUM 40 MG: 40 INJECTION SUBCUTANEOUS at 14:20

## 2022-01-01 RX ADMIN — ESCITALOPRAM OXALATE 10 MG: 10 TABLET ORAL at 08:53

## 2022-01-01 RX ADMIN — DEXTROSE MONOHYDRATE 25 ML: 25 INJECTION, SOLUTION INTRAVENOUS at 22:30

## 2022-01-01 RX ADMIN — CEFEPIME HYDROCHLORIDE 2 G: 2 INJECTION, POWDER, FOR SOLUTION INTRAVENOUS at 03:11

## 2022-01-01 RX ADMIN — ESCITALOPRAM OXALATE 10 MG: 10 TABLET ORAL at 09:07

## 2022-01-01 RX ADMIN — Medication 6.25 MG: at 21:29

## 2022-01-01 RX ADMIN — Medication 6.25 MG: at 21:26

## 2022-01-01 RX ADMIN — ESCITALOPRAM OXALATE 10 MG: 10 TABLET ORAL at 08:46

## 2022-01-01 RX ADMIN — IPRATROPIUM BROMIDE AND ALBUTEROL SULFATE 3 ML: 2.5; .5 SOLUTION RESPIRATORY (INHALATION) at 15:43

## 2022-01-01 RX ADMIN — Medication 6.25 MG: at 22:42

## 2022-01-01 RX ADMIN — ASPIRIN 81 MG CHEWABLE TABLET 81 MG: 81 TABLET CHEWABLE at 08:58

## 2022-01-01 RX ADMIN — ENOXAPARIN SODIUM 40 MG: 40 INJECTION SUBCUTANEOUS at 13:56

## 2022-01-01 RX ADMIN — ENOXAPARIN SODIUM 40 MG: 40 INJECTION SUBCUTANEOUS at 12:58

## 2022-01-01 RX ADMIN — BENZONATATE 100 MG: 100 CAPSULE ORAL at 03:21

## 2022-01-01 RX ADMIN — ASPIRIN 81 MG CHEWABLE TABLET 81 MG: 81 TABLET CHEWABLE at 08:12

## 2022-01-01 RX ADMIN — CEFEPIME HYDROCHLORIDE 2 G: 2 INJECTION, POWDER, FOR SOLUTION INTRAVENOUS at 03:08

## 2022-01-01 RX ADMIN — Medication 1 MG: at 00:30

## 2022-01-01 RX ADMIN — ESCITALOPRAM OXALATE 10 MG: 10 TABLET ORAL at 08:18

## 2022-01-01 RX ADMIN — Medication 1 MG: at 22:04

## 2022-01-01 RX ADMIN — Medication 6.25 MG: at 22:56

## 2022-01-01 RX ADMIN — Medication 6.25 MG: at 21:41

## 2022-01-01 RX ADMIN — DEXTROSE 30 G: 15 GEL ORAL at 13:15

## 2022-01-01 RX ADMIN — ACETAMINOPHEN 325 MG: 325 TABLET, FILM COATED ORAL at 21:26

## 2022-01-01 RX ADMIN — ALPRAZOLAM 0.25 MG: 0.25 TABLET ORAL at 15:10

## 2022-01-01 RX ADMIN — Medication 6.25 MG: at 22:04

## 2022-01-01 RX ADMIN — ENOXAPARIN SODIUM 40 MG: 40 INJECTION SUBCUTANEOUS at 15:02

## 2022-01-01 RX ADMIN — ESCITALOPRAM OXALATE 10 MG: 10 TABLET ORAL at 09:05

## 2022-01-01 RX ADMIN — Medication 6.25 MG: at 21:03

## 2022-01-01 RX ADMIN — Medication 1 MG: at 21:33

## 2022-01-01 RX ADMIN — DEXTROSE MONOHYDRATE 25 ML: 25 INJECTION, SOLUTION INTRAVENOUS at 07:05

## 2022-01-01 RX ADMIN — Medication 1 MG: at 21:12

## 2022-01-01 RX ADMIN — HUMAN INSULIN 3 UNITS/HR: 100 INJECTION, SOLUTION SUBCUTANEOUS at 06:09

## 2022-01-01 RX ADMIN — CEFEPIME HYDROCHLORIDE 2 G: 2 INJECTION, POWDER, FOR SOLUTION INTRAVENOUS at 15:02

## 2022-01-01 RX ADMIN — Medication 1 MG: at 21:41

## 2022-01-01 RX ADMIN — CEFEPIME HYDROCHLORIDE 2 G: 2 INJECTION, POWDER, FOR SOLUTION INTRAVENOUS at 14:30

## 2022-01-01 RX ADMIN — Medication 1 MG: at 22:42

## 2022-01-01 RX ADMIN — METOPROLOL SUCCINATE 25 MG: 25 TABLET, EXTENDED RELEASE ORAL at 08:10

## 2022-01-01 RX ADMIN — INSULIN DEGLUDEC INJECTION 5 UNITS: 100 INJECTION, SOLUTION SUBCUTANEOUS at 20:51

## 2022-01-01 RX ADMIN — Medication 1 MG: at 21:03

## 2022-01-01 RX ADMIN — ASPIRIN 81 MG CHEWABLE TABLET 81 MG: 81 TABLET CHEWABLE at 08:51

## 2022-01-01 RX ADMIN — METOPROLOL SUCCINATE 25 MG: 25 TABLET, EXTENDED RELEASE ORAL at 08:43

## 2022-01-01 RX ADMIN — Medication 1 MG: at 21:30

## 2022-01-01 RX ADMIN — CEFEPIME HYDROCHLORIDE 2 G: 2 INJECTION, POWDER, FOR SOLUTION INTRAVENOUS at 14:52

## 2022-01-01 RX ADMIN — ESCITALOPRAM OXALATE 10 MG: 10 TABLET ORAL at 08:43

## 2022-01-01 RX ADMIN — ASPIRIN 81 MG CHEWABLE TABLET 81 MG: 81 TABLET CHEWABLE at 10:04

## 2022-01-01 RX ADMIN — INSULIN ASPART 3 UNITS: 100 INJECTION, SOLUTION INTRAVENOUS; SUBCUTANEOUS at 13:57

## 2022-01-01 RX ADMIN — METOPROLOL SUCCINATE 25 MG: 25 TABLET, EXTENDED RELEASE ORAL at 18:51

## 2022-01-01 RX ADMIN — ENOXAPARIN SODIUM 40 MG: 40 INJECTION SUBCUTANEOUS at 13:34

## 2022-01-01 RX ADMIN — ENOXAPARIN SODIUM 40 MG: 40 INJECTION SUBCUTANEOUS at 13:27

## 2022-01-01 RX ADMIN — Medication 15 G: at 01:52

## 2022-01-01 RX ADMIN — HUMAN INSULIN 5.5 UNITS/HR: 100 INJECTION, SOLUTION SUBCUTANEOUS at 21:55

## 2022-01-01 RX ADMIN — DEXTROSE MONOHYDRATE 25 ML: 25 INJECTION, SOLUTION INTRAVENOUS at 01:16

## 2022-01-01 RX ADMIN — METOPROLOL SUCCINATE 25 MG: 25 TABLET, EXTENDED RELEASE ORAL at 09:05

## 2022-01-01 RX ADMIN — ENOXAPARIN SODIUM 40 MG: 40 INJECTION SUBCUTANEOUS at 13:58

## 2022-01-01 RX ADMIN — Medication 1 MG: at 00:29

## 2022-01-01 RX ADMIN — ENOXAPARIN SODIUM 40 MG: 40 INJECTION SUBCUTANEOUS at 15:01

## 2022-01-01 RX ADMIN — INSULIN GLARGINE 4 UNITS: 100 INJECTION, SOLUTION SUBCUTANEOUS at 14:59

## 2022-01-01 RX ADMIN — ESCITALOPRAM OXALATE 10 MG: 10 TABLET ORAL at 08:52

## 2022-01-01 RX ADMIN — ESCITALOPRAM OXALATE 10 MG: 10 TABLET ORAL at 08:12

## 2022-01-01 RX ADMIN — ESCITALOPRAM OXALATE 10 MG: 10 TABLET ORAL at 08:03

## 2022-01-01 RX ADMIN — METOPROLOL SUCCINATE 25 MG: 25 TABLET, EXTENDED RELEASE ORAL at 10:04

## 2022-01-01 RX ADMIN — ASPIRIN 81 MG CHEWABLE TABLET 81 MG: 81 TABLET CHEWABLE at 08:43

## 2022-01-01 RX ADMIN — METOPROLOL SUCCINATE 25 MG: 25 TABLET, EXTENDED RELEASE ORAL at 08:12

## 2022-01-01 RX ADMIN — METOPROLOL SUCCINATE 25 MG: 25 TABLET, EXTENDED RELEASE ORAL at 07:47

## 2022-01-01 RX ADMIN — POLYETHYLENE GLYCOL 3350 17 G: 17 POWDER, FOR SOLUTION ORAL at 08:46

## 2022-01-01 RX ADMIN — CEFEPIME HYDROCHLORIDE 2 G: 2 INJECTION, POWDER, FOR SOLUTION INTRAVENOUS at 14:25

## 2022-01-01 RX ADMIN — ESCITALOPRAM OXALATE 10 MG: 10 TABLET ORAL at 10:04

## 2022-01-01 RX ADMIN — CEFEPIME HYDROCHLORIDE 2 G: 2 INJECTION, POWDER, FOR SOLUTION INTRAVENOUS at 13:45

## 2022-01-01 RX ADMIN — Medication 6.25 MG: at 22:18

## 2022-01-01 RX ADMIN — Medication 6.25 MG: at 22:41

## 2022-01-01 RX ADMIN — CEFEPIME HYDROCHLORIDE 2 G: 2 INJECTION, POWDER, FOR SOLUTION INTRAVENOUS at 03:09

## 2022-01-01 RX ADMIN — METOPROLOL SUCCINATE 25 MG: 25 TABLET, EXTENDED RELEASE ORAL at 08:52

## 2022-01-01 RX ADMIN — Medication 6.25 MG: at 21:45

## 2022-01-01 RX ADMIN — BENZONATATE 100 MG: 100 CAPSULE ORAL at 18:11

## 2022-01-01 RX ADMIN — Medication 6.25 MG: at 21:32

## 2022-01-01 RX ADMIN — SODIUM CHLORIDE 500 ML: 9 INJECTION, SOLUTION INTRAVENOUS at 01:17

## 2022-01-01 RX ADMIN — METOPROLOL SUCCINATE 25 MG: 25 TABLET, EXTENDED RELEASE ORAL at 08:46

## 2022-01-01 RX ADMIN — ESCITALOPRAM OXALATE 10 MG: 10 TABLET ORAL at 09:45

## 2022-01-01 RX ADMIN — ASPIRIN 81 MG CHEWABLE TABLET 81 MG: 81 TABLET CHEWABLE at 09:05

## 2022-01-01 RX ADMIN — METOPROLOL SUCCINATE 25 MG: 25 TABLET, EXTENDED RELEASE ORAL at 08:31

## 2022-01-01 RX ADMIN — ASPIRIN 81 MG CHEWABLE TABLET 81 MG: 81 TABLET CHEWABLE at 08:52

## 2022-01-01 RX ADMIN — ESCITALOPRAM OXALATE 10 MG: 10 TABLET ORAL at 08:31

## 2022-01-01 RX ADMIN — ASPIRIN 81 MG CHEWABLE TABLET 81 MG: 81 TABLET CHEWABLE at 08:31

## 2022-01-01 RX ADMIN — ESCITALOPRAM OXALATE 10 MG: 10 TABLET ORAL at 08:10

## 2022-01-01 RX ADMIN — INSULIN DEGLUDEC INJECTION 8 UNITS: 100 INJECTION, SOLUTION SUBCUTANEOUS at 19:22

## 2022-01-01 RX ADMIN — BENZONATATE 100 MG: 100 CAPSULE ORAL at 21:33

## 2022-01-01 RX ADMIN — BENZONATATE 100 MG: 100 CAPSULE ORAL at 12:13

## 2022-01-01 RX ADMIN — ENOXAPARIN SODIUM 40 MG: 40 INJECTION SUBCUTANEOUS at 14:52

## 2022-01-01 RX ADMIN — Medication 6.25 MG: at 21:33

## 2022-01-01 RX ADMIN — METOPROLOL SUCCINATE 25 MG: 25 TABLET, EXTENDED RELEASE ORAL at 08:58

## 2022-01-01 RX ADMIN — INSULIN DEGLUDEC INJECTION 5 UNITS: 100 INJECTION, SOLUTION SUBCUTANEOUS at 20:02

## 2022-01-01 RX ADMIN — BENZONATATE 100 MG: 100 CAPSULE ORAL at 17:04

## 2022-01-01 RX ADMIN — CEFEPIME HYDROCHLORIDE 2 G: 2 INJECTION, POWDER, FOR SOLUTION INTRAVENOUS at 03:03

## 2022-01-01 RX ADMIN — METOPROLOL SUCCINATE 25 MG: 25 TABLET, EXTENDED RELEASE ORAL at 08:18

## 2022-01-01 RX ADMIN — CEFEPIME HYDROCHLORIDE 2 G: 2 INJECTION, POWDER, FOR SOLUTION INTRAVENOUS at 02:59

## 2022-01-01 RX ADMIN — METOPROLOL SUCCINATE 25 MG: 25 TABLET, EXTENDED RELEASE ORAL at 09:45

## 2022-01-01 RX ADMIN — ENOXAPARIN SODIUM 40 MG: 40 INJECTION SUBCUTANEOUS at 14:30

## 2022-01-01 RX ADMIN — ENOXAPARIN SODIUM 40 MG: 40 INJECTION SUBCUTANEOUS at 13:23

## 2022-01-01 RX ADMIN — ENOXAPARIN SODIUM 40 MG: 40 INJECTION SUBCUTANEOUS at 14:43

## 2022-01-01 RX ADMIN — METOPROLOL SUCCINATE 25 MG: 25 TABLET, EXTENDED RELEASE ORAL at 08:02

## 2022-01-01 RX ADMIN — METOPROLOL SUCCINATE 25 MG: 25 TABLET, EXTENDED RELEASE ORAL at 08:53

## 2022-01-01 RX ADMIN — BENZONATATE 100 MG: 100 CAPSULE ORAL at 09:07

## 2022-01-01 RX ADMIN — INSULIN ASPART 3 UNITS: 100 INJECTION, SOLUTION INTRAVENOUS; SUBCUTANEOUS at 12:45

## 2022-01-01 RX ADMIN — Medication 6.25 MG: at 21:12

## 2022-01-01 RX ADMIN — METOPROLOL SUCCINATE 25 MG: 25 TABLET, EXTENDED RELEASE ORAL at 09:07

## 2022-01-01 RX ADMIN — ASPIRIN 81 MG CHEWABLE TABLET 81 MG: 81 TABLET CHEWABLE at 14:30

## 2022-01-01 RX ADMIN — ASPIRIN 81 MG CHEWABLE TABLET 81 MG: 81 TABLET CHEWABLE at 08:10

## 2022-01-01 RX ADMIN — ASPIRIN 81 MG CHEWABLE TABLET 81 MG: 81 TABLET CHEWABLE at 08:18

## 2022-01-01 RX ADMIN — ASPIRIN 81 MG CHEWABLE TABLET 81 MG: 81 TABLET CHEWABLE at 08:53

## 2022-01-01 ASSESSMENT — ACTIVITIES OF DAILY LIVING (ADL)
ADLS_ACUITY_SCORE: 34
ADLS_ACUITY_SCORE: 37
ADLS_ACUITY_SCORE: 40
VISION_MANAGEMENT: READING GLASSES
ADLS_ACUITY_SCORE: 34
ADLS_ACUITY_SCORE: 40
ADLS_ACUITY_SCORE: 34
ADLS_ACUITY_SCORE: 37
ADLS_ACUITY_SCORE: 37
ADLS_ACUITY_SCORE: 40
ADLS_ACUITY_SCORE: 41
ADLS_ACUITY_SCORE: 37
ADLS_ACUITY_SCORE: 34
ADLS_ACUITY_SCORE: 33
ADLS_ACUITY_SCORE: 37
ADLS_ACUITY_SCORE: 37
ADLS_ACUITY_SCORE: 34
ADLS_ACUITY_SCORE: 41
ADLS_ACUITY_SCORE: 41
ADLS_ACUITY_SCORE: 34
ADLS_ACUITY_SCORE: 41
ADLS_ACUITY_SCORE: 37
DOING_ERRANDS_INDEPENDENTLY_DIFFICULTY: NO
ADLS_ACUITY_SCORE: 37
ADLS_ACUITY_SCORE: 37
ADLS_ACUITY_SCORE: 42
ADLS_ACUITY_SCORE: 41
ADLS_ACUITY_SCORE: 41
ADLS_ACUITY_SCORE: 35
ADLS_ACUITY_SCORE: 41
ADLS_ACUITY_SCORE: 34
ADLS_ACUITY_SCORE: 41
ADLS_ACUITY_SCORE: 41
ADLS_ACUITY_SCORE: 34
ADLS_ACUITY_SCORE: 41
ADLS_ACUITY_SCORE: 33
ADLS_ACUITY_SCORE: 34
ADLS_ACUITY_SCORE: 40
ADLS_ACUITY_SCORE: 40
ADLS_ACUITY_SCORE: 41
ADLS_ACUITY_SCORE: 41
ADLS_ACUITY_SCORE: 37
ADLS_ACUITY_SCORE: 35
ADLS_ACUITY_SCORE: 40
ADLS_ACUITY_SCORE: 34
ADLS_ACUITY_SCORE: 41
ADLS_ACUITY_SCORE: 34
ADLS_ACUITY_SCORE: 41
ADLS_ACUITY_SCORE: 41
ADLS_ACUITY_SCORE: 37
DEPENDENT_IADLS:: INDEPENDENT
ADLS_ACUITY_SCORE: 41
ADLS_ACUITY_SCORE: 41
ADLS_ACUITY_SCORE: 37
ADLS_ACUITY_SCORE: 41
ADLS_ACUITY_SCORE: 35
ADLS_ACUITY_SCORE: 34
ADLS_ACUITY_SCORE: 35
ADLS_ACUITY_SCORE: 41
ADLS_ACUITY_SCORE: 34
ADLS_ACUITY_SCORE: 41
ADLS_ACUITY_SCORE: 40
ADLS_ACUITY_SCORE: 41
ADLS_ACUITY_SCORE: 37
ADLS_ACUITY_SCORE: 41
ADLS_ACUITY_SCORE: 37
ADLS_ACUITY_SCORE: 33
ADLS_ACUITY_SCORE: 41
ADLS_ACUITY_SCORE: 37
ADLS_ACUITY_SCORE: 41
ADLS_ACUITY_SCORE: 33
ADLS_ACUITY_SCORE: 41
ADLS_ACUITY_SCORE: 33
ADLS_ACUITY_SCORE: 41
ADLS_ACUITY_SCORE: 40
ADLS_ACUITY_SCORE: 41
ADLS_ACUITY_SCORE: 41
ADLS_ACUITY_SCORE: 33
ADLS_ACUITY_SCORE: 41
ADLS_ACUITY_SCORE: 37
ADLS_ACUITY_SCORE: 37
EQUIPMENT_CURRENTLY_USED_AT_HOME: CANE, STRAIGHT
ADLS_ACUITY_SCORE: 40
ADLS_ACUITY_SCORE: 33
ADLS_ACUITY_SCORE: 34
ADLS_ACUITY_SCORE: 37
ADLS_ACUITY_SCORE: 41
ADLS_ACUITY_SCORE: 37
ADLS_ACUITY_SCORE: 35
ADLS_ACUITY_SCORE: 34
ADLS_ACUITY_SCORE: 34
ADLS_ACUITY_SCORE: 41
ADLS_ACUITY_SCORE: 41
ADLS_ACUITY_SCORE: 35
ADLS_ACUITY_SCORE: 41
ADLS_ACUITY_SCORE: 33
ADLS_ACUITY_SCORE: 33
ADLS_ACUITY_SCORE: 37
ADLS_ACUITY_SCORE: 37
ADLS_ACUITY_SCORE: 41
ADLS_ACUITY_SCORE: 35
ADLS_ACUITY_SCORE: 35
ADLS_ACUITY_SCORE: 37
ADLS_ACUITY_SCORE: 34
ADLS_ACUITY_SCORE: 35
WALKING_OR_CLIMBING_STAIRS_DIFFICULTY: YES
ADLS_ACUITY_SCORE: 41
ADLS_ACUITY_SCORE: 37
ADLS_ACUITY_SCORE: 37
ADLS_ACUITY_SCORE: 41
TOILETING: 0-->NOT TOILET TRAINED OR ASSISTANCE NEEDED (DEVELOPMENTALLY APPROPRIATE)
ADLS_ACUITY_SCORE: 41
TOILETING_ASSISTANCE: TOILETING DIFFICULTY, REQUIRES EQUIPMENT
ADLS_ACUITY_SCORE: 41
ADLS_ACUITY_SCORE: 35
ADLS_ACUITY_SCORE: 40
DIFFICULTY_EATING/SWALLOWING: NO
ADLS_ACUITY_SCORE: 41
ADLS_ACUITY_SCORE: 41
ADLS_ACUITY_SCORE: 40
FALL_HISTORY_WITHIN_LAST_SIX_MONTHS: YES
ADLS_ACUITY_SCORE: 37
ADLS_ACUITY_SCORE: 41
WALKING_OR_CLIMBING_STAIRS: AMBULATION DIFFICULTY, REQUIRES EQUIPMENT
ADLS_ACUITY_SCORE: 41
ADLS_ACUITY_SCORE: 37
ADLS_ACUITY_SCORE: 41
TRANSFERRING: 1-->ASSISTANCE (EQUIPMENT/PERSON) NEEDED (NOT DEVELOPMENTALLY APPROPRIATE)
NUMBER_OF_TIMES_PATIENT_HAS_FALLEN_WITHIN_LAST_SIX_MONTHS: 5
ADLS_ACUITY_SCORE: 40
ADLS_ACUITY_SCORE: 37
ADLS_ACUITY_SCORE: 41
ADLS_ACUITY_SCORE: 37
ADLS_ACUITY_SCORE: 41
ADLS_ACUITY_SCORE: 34
ADLS_ACUITY_SCORE: 37
ADLS_ACUITY_SCORE: 34
ADLS_ACUITY_SCORE: 41
ADLS_ACUITY_SCORE: 37
ADLS_ACUITY_SCORE: 41
ADLS_ACUITY_SCORE: 34
ADLS_ACUITY_SCORE: 37
TOILETING: 1-->ASSISTANCE (EQUIPMENT/PERSON) NEEDED
ADLS_ACUITY_SCORE: 35
ADLS_ACUITY_SCORE: 35
ADLS_ACUITY_SCORE: 37
CHANGE_IN_FUNCTIONAL_STATUS_SINCE_ONSET_OF_CURRENT_ILLNESS/INJURY: NO
CONCENTRATING,_REMEMBERING_OR_MAKING_DECISIONS_DIFFICULTY: YES
ADLS_ACUITY_SCORE: 37
ADLS_ACUITY_SCORE: 37
ADLS_ACUITY_SCORE: 35
ADLS_ACUITY_SCORE: 37
ADLS_ACUITY_SCORE: 34
ADLS_ACUITY_SCORE: 34
ADLS_ACUITY_SCORE: 41
ADLS_ACUITY_SCORE: 37
ADLS_ACUITY_SCORE: 41
ADLS_ACUITY_SCORE: 35
ADLS_ACUITY_SCORE: 34
ADLS_ACUITY_SCORE: 37
ADLS_ACUITY_SCORE: 41
ADLS_ACUITY_SCORE: 41
TRANSFERRING: 0-->INDEPENDENT
ADLS_ACUITY_SCORE: 41
ADLS_ACUITY_SCORE: 41
ADLS_ACUITY_SCORE: 33
ADLS_ACUITY_SCORE: 34
ADLS_ACUITY_SCORE: 41
ADLS_ACUITY_SCORE: 34
ADLS_ACUITY_SCORE: 37
ADLS_ACUITY_SCORE: 41
ADLS_ACUITY_SCORE: 41
ADLS_ACUITY_SCORE: 34
ADLS_ACUITY_SCORE: 41
ADLS_ACUITY_SCORE: 41
DRESSING/BATHING_DIFFICULTY: NO
ADLS_ACUITY_SCORE: 41
ADLS_ACUITY_SCORE: 41
ADLS_ACUITY_SCORE: 34
ADLS_ACUITY_SCORE: 41
ADLS_ACUITY_SCORE: 41
ADLS_ACUITY_SCORE: 34
ADLS_ACUITY_SCORE: 41
TOILETING_ISSUES: YES
ADLS_ACUITY_SCORE: 34
ADLS_ACUITY_SCORE: 41
ADLS_ACUITY_SCORE: 41
ADLS_ACUITY_SCORE: 33
ADLS_ACUITY_SCORE: 37
ADLS_ACUITY_SCORE: 34
ADLS_ACUITY_SCORE: 41
ADLS_ACUITY_SCORE: 37
ADLS_ACUITY_SCORE: 34
ADLS_ACUITY_SCORE: 41
ADLS_ACUITY_SCORE: 34
ADLS_ACUITY_SCORE: 41
WEAR_GLASSES_OR_BLIND: YES
ADLS_ACUITY_SCORE: 41
ADLS_ACUITY_SCORE: 33

## 2022-01-01 ASSESSMENT — ENCOUNTER SYMPTOMS
HEADACHES: 0
COLOR CHANGE: 0
CONFUSION: 0
ABDOMINAL PAIN: 0
FEVER: 0
NAUSEA: 1
EYE DISCHARGE: 1
VOMITING: 1
CHILLS: 0
SORE THROAT: 1
SHORTNESS OF BREATH: 1
RHINORRHEA: 1
DIARRHEA: 1
ARTHRALGIAS: 0
EYE REDNESS: 0
COUGH: 1
DIFFICULTY URINATING: 0
PALPITATIONS: 0

## 2022-07-17 NOTE — LETTER
Transition Communication Hand-off for Care Transitions to Next Level of Care Provider    Name: Yanira Keene  : 1945  MRN #: [id number]  Primary Care Provider: No primary care provider on file.     Primary Clinic: No primary provider on file.     Reason for Hospitalization:  Pneumonia of right lower lobe due to infectious organism [J18.9]  Uncontrolled type 1 diabetes mellitus with hyperglycemia (H) [E10.65]  Anemia, unspecified type [D64.9]  Admit Date/Time: 2022 10:10 PM  Discharge Date: 2022  Payor Source: Payor: [employer] / Plan: [employer]ICARE ADVANTAGE / Product Type: HMO /          Reason for Communication Hand-off Referral: Difficulty understanding plan of care  Non-adherence to plan of care related to:  Declined TCU    Discharge Plan:  Patient was supposed to discharge to TCU. However, son transported her and decided to take her home instead (per patient's wishes). SW submitted a vulnerable adult report.      Concern for non-adherence with plan of care: Yes  Discharge Needs Assessment:  Needs    Flowsheet Row Most Recent Value   Equipment Currently Used at Home cane, straight, shower chair          Already enrolled in Tele-monitoring program and name of program:  Unknown   Follow-up specialty is recommended: Yes    Follow-up plan:    Future Appointments   Date Time Provider Department Center   2022 10:45 AM [facility]       Any outstanding tests or procedures:        Referrals     Future Labs/Procedures    Occupational Therapy Adult Consult     Comments:    Evaluate and treat as clinically indicated.    Reason:  Deconditioning    Physical Therapy Adult Consult     Comments:    Evaluate and treat as clinically indicated.    Reason:  Deconditoning            PENNY Bautista    Please see attached AVS/Discharge Summary

## 2022-07-17 NOTE — LETTER
Recipient: Clari Echevarria          Sender: MYLENE Goodrich at [facility] [phone]          Date: August 4, 2022  Patient Name:  Yanira Keene  Patient YOB: 1945  Routing Message: Discharge orders. [id number]. Please call if you have any questions or concerns. Thanks!        The documents accompanying this notice contain confidential information belonging to the sender.  This information is intended only for the use of the individual or entity named above.  The authorized recipient of this information is prohibited from disclosing this information to any other party and is required to destroy the information after its stated need has been fulfilled, unless otherwise required by state law.    If you are not the intended recipient, you are hereby notified that any disclosure, copy, distribution or action taken in reliance on the contents of these documents is strictly prohibited.  If you have received this document in error, please return it by fax to [phone] with a note on the cover sheet explaining why you are returning it (e.g. not your patient, not your provider, etc.).  If you need further assistance, please call .  Documents may also be returned by mail to [employer], , [facility] [address], [place], [place] [address].

## 2022-07-17 NOTE — LETTER
Recipient: Wale Montague lialuis     F: [phone]          Sender: [facility]    Ph: [phone]          Date: July 26, 2022  Patient Name:  Yanira Keene  Patient YOB: 1945  Routing Message: Please see attached TCU referral for [facility] location. Pt is medically ready to discharge. Pt has [employer] insurance.         The documents accompanying this notice contain confidential information belonging to the sender.  This information is intended only for the use of the individual or entity named above.  The authorized recipient of this information is prohibited from disclosing this information to any other party and is required to destroy the information after its stated need has been fulfilled, unless otherwise required by state law.    If you are not the intended recipient, you are hereby notified that any disclosure, copy, distribution or action taken in reliance on the contents of these documents is strictly prohibited.  If you have received this document in error, please return it by fax to [phone] with a note on the cover sheet explaining why you are returning it (e.g. not your patient, not your provider, etc.).  If you need further assistance, please call .  Documents may also be returned by mail to [employer], , [address], [place], [place] [address].

## 2022-07-18 PROBLEM — J18.9 PNEUMONIA OF RIGHT LOWER LOBE DUE TO INFECTIOUS ORGANISM: Status: ACTIVE | Noted: 2022-01-01

## 2022-07-18 PROBLEM — E10.65 UNCONTROLLED TYPE 1 DIABETES MELLITUS WITH HYPERGLYCEMIA (H): Status: ACTIVE | Noted: 2022-01-01

## 2022-07-18 NOTE — ED PROVIDER NOTES
ED Provider Note  [facility]      History     Chief Complaint   Patient presents with     Covid Concern     Onset one week ago with cough, fatigue, generalized body aches.     HPI  Yanira Keene is a 77 year old female who has a history of type 1 diabetes with a history of diabetic retinopathy, coronary artery disease status post LAD stenting in 2005, dyslipidemia, hypertension, cognitive impairment and chronic anemia, presenting to the emergency department today with uncontrolled blood sugars, cough, shortness of breath, in the context of a recent positive COVID test.  Patient reports that her blood sugars have been out of control for a couple of weeks.  She thinks that her blood sugars may have been as high as 290 but she is not positive about that.  She reports she takes Lantus 6 unit(s) in the morning as well as NovoLog as needed.  She believes that she is taking her insulin though she is not certain.  Prior record does make note of some cognitive impairment.  Patient lives alone.  Her daughter was here visiting from [place], but went home a few days ago.  Patient has been feeling very weak and lethargic.  She has not had much of an appetite, has been trying to drink water but has not been eating well.  She is not certain when the last time she gave herself her insulin was.  4 days ago she began to develop a cough which was dry and nonproductive, she does have some shortness of breath.  She is also noting nasal congestion, sore throat, and right eye drainage.  She denies any chest pain, she has no abdominal pain but has had nausea, vomiting, and diarrhea.  These symptoms have been going on for 4 days, last episode of emesis was yesterday.  She had 1 semiformed stool today.  She denies urinary symptoms.    Patient reports that she had a positive home COVID test 4 days ago.  Incidentally, she had her second booster, fourth COVID vaccination, on July 11.    Record review  shows that she was recently admitted at [facility] on July 6, discharged on July 8, for elevated troponins in the setting of uncontrolled hyperglycemia, chronic anemia, cognitive impairment.     No past medical history on file.    No past surgical history on file.    No family history on file.    Social History     Tobacco Use     Smoking status: Not on file     Smokeless tobacco: Not on file   Substance Use Topics     Alcohol use: Not on file         Past Medical History  No past medical history on file.  No past surgical history on file.  No current outpatient medications on file.    Allergies   Allergen Reactions     Penicillins Rash     Family History  No family history on file.  Social History       Past medical history, past surgical history, medications, allergies, family history, and social history were reviewed with the patient. No additional pertinent items.       Review of Systems   Constitutional: Negative for chills and fever.   HENT: Positive for rhinorrhea and sore throat. Negative for congestion.    Eyes: Positive for discharge. Negative for redness.   Respiratory: Positive for cough and shortness of breath.    Cardiovascular: Negative for chest pain and palpitations.   Gastrointestinal: Positive for diarrhea, nausea and vomiting. Negative for abdominal pain.   Genitourinary: Negative for difficulty urinating.   Musculoskeletal: Negative for arthralgias.   Skin: Negative for color change.   Neurological: Negative for headaches.   Psychiatric/Behavioral: Negative for confusion.   All other systems reviewed and are negative.    A complete review of systems was performed with pertinent positives and negatives noted in the HPI, and all other systems negative.    Physical Exam   BP: 104/58  Pulse: 94  Temp: 98.9  F (37.2  C)  Resp: 16  SpO2: 96 %  Physical Exam  Vitals and nursing note reviewed.   Constitutional:       General: She is not in acute distress.     Appearance: She is underweight. She  is not diaphoretic.      Comments: Frail elderly patient, lying back in bed, appears somewhat confused, no acute distress   HENT:      Head: Atraumatic.      Mouth/Throat:      Pharynx: No oropharyngeal exudate.   Eyes:      General: No scleral icterus.        Right eye: Discharge present.      Pupils: Pupils are equal, round, and reactive to light.      Comments: Yellow right eye drainage with slightly injected conjunctiva   Cardiovascular:      Rate and Rhythm: Normal rate. Rhythm irregular.      Heart sounds: Murmur heard.   Pulmonary:      Effort: Pulmonary effort is normal. No respiratory distress.      Breath sounds: No wheezing.      Comments: Frequent dry cough.  Faint dry crackles heard at right lung base.  No respiratory distress.  Abdominal:      General: Bowel sounds are normal.      Palpations: Abdomen is soft.      Tenderness: There is no abdominal tenderness. There is no guarding or rebound.   Musculoskeletal:         General: No tenderness.   Skin:     General: Skin is warm.      Findings: No rash.         ED Course      Procedures       The medical record was reviewed and interpreted.  Current labs reviewed and interpreted.  Current images reviewed and interpreted: CXR - R basilar pneumonia.            EKG Interpretation:      Interpreted by Jojo Fisher MD  Time reviewed:0030   Symptoms at time of EKG: None   Rhythm: Normal sinus with PACs and PVCs  Rate: Normal  Axis: Normal  Ectopy: Occasional PVCs  Conduction: Normal  ST Segments/ T Waves: No ST-T wave changes and No acute ischemic changes  Q Waves: None  Comparison to prior: No old EKG available    Clinical Impression: normal EKG           Results for orders placed or performed during the hospital encounter of 07/17/22   XR Chest Port 1 View     Status: None    Narrative    EXAM: XR CHEST PORT 1 VIEW  LOCATION: [facility]  DATE/TIME: 7/17/2022 11:47 PM    INDICATION: Cough.  COMPARISON:  None.    FINDINGS: The heart size is normal. There is a right basilar infiltrate. Minimal left basilar atelectasis or scarring. The lungs are otherwise clear. No pneumothorax. Degenerative disease and scoliosis in the spine. Old left rib fractures.      Impression    IMPRESSION: Right basilar pneumonia.   Glucose by meter     Status: Abnormal   Result Value Ref Range    GLUCOSE BY METER POCT 468 (H) 70 - 99 mg/dL   Basic metabolic panel     Status: Abnormal   Result Value Ref Range    Sodium 132 (L) 133 - 144 mmol/L    Potassium 3.8 3.4 - 5.3 mmol/L    Chloride 97 94 - 109 mmol/L    Carbon Dioxide (CO2) 26 20 - 32 mmol/L    Anion Gap 9 3 - 14 mmol/L    Urea Nitrogen 37 (H) 7 - 30 mg/dL    Creatinine 1.32 (H) 0.52 - 1.04 mg/dL    Calcium 8.7 8.5 - 10.1 mg/dL    Glucose 449 (H) 70 - 99 mg/dL    GFR Estimate 41 (L) >60 mL/min/1.73m2   Ketone Beta-Hydroxybutyrate Quantitative,Rapid     Status: Normal   Result Value Ref Range    Ketone (Beta-Hydroxybutyrate) Quantitative 0.4 0.0 - 0.6 mmol/L   CBC with platelets and differential     Status: Abnormal   Result Value Ref Range    WBC Count 6.7 4.0 - 11.0 10e3/uL    RBC Count 2.68 (L) 3.80 - 5.20 10e6/uL    Hemoglobin 8.4 (L) 11.7 - 15.7 g/dL    Hematocrit 24.2 (L) 35.0 - 47.0 %    MCV 90 78 - 100 fL    MCH 31.3 26.5 - 33.0 pg    MCHC 34.7 31.5 - 36.5 g/dL    RDW 12.9 10.0 - 15.0 %    Platelet Count 230 150 - 450 10e3/uL    % Neutrophils 75 %    % Lymphocytes 15 %    % Monocytes 9 %    % Eosinophils 1 %    % Basophils 0 %    % Immature Granulocytes 0 %    NRBCs per 100 WBC 0 <1 /100    Absolute Neutrophils 5.0 1.6 - 8.3 10e3/uL    Absolute Lymphocytes 1.0 0.8 - 5.3 10e3/uL    Absolute Monocytes 0.6 0.0 - 1.3 10e3/uL    Absolute Eosinophils 0.1 0.0 - 0.7 10e3/uL    Absolute Basophils 0.0 0.0 - 0.2 10e3/uL    Absolute Immature Granulocytes 0.0 <=0.4 10e3/uL    Absolute NRBCs 0.0 10e3/uL   Symptomatic; Unknown Influenza A/B & SARS-CoV2 (COVID-19) Virus PCR Multiplex Nose      Status: Normal    Specimen: Nose; Swab   Result Value Ref Range    Influenza A PCR Negative Negative    Influenza B PCR Negative Negative    RSV PCR Negative Negative    SARS CoV2 PCR Negative Negative, Testing sent to reference lab. Results will be returned via unsolicited result    Narrative    Testing was performed using the Xpert Xpress CoV2/Flu/RSV Assay on the [employer] GeneXpert Instrument. This test should be ordered for the detection of SARS-CoV-2 and influenza viruses in individuals who meet clinical and/or epidemiological criteria. Test performance is unknown in asymptomatic patients. This test is for in vitro diagnostic use under the FDA EUA for laboratories certified under CLIA to perform high or moderate complexity testing. This test has not been FDA cleared or approved. A negative result does not rule out the presence of PCR inhibitors in the specimen or target RNA in concentration below the limit of detection for the assay. If only one viral target is positive but coinfection with multiple targets is suspected, the sample should be re-tested with another FDA cleared, approved, or authorized test, if coinfection would change clinical management. This test was validated by the [facility]. These laboratories are certified under the Clinical  Laboratory Improvement Amendments of 1988 (CLIA-88) as qualified to perform high complexity laboratory testing.   CBC with Platelets & Differential     Status: Abnormal    Narrative    The following orders were created for panel order CBC with Platelets & Differential.  Procedure                               Abnormality         Status                     ---------                               -----------         ------                     CBC with platelets and d...[[id number]]  Abnormal            Final result                 Please view results for these tests on the individual orders.     Medications   ceFEPIme (MAXIPIME) 2 g vial to attach to  " ml bag for ADULTS or 50 ml bag for PEDS (2 g Intravenous New Bag 7/18/22 0259)   0.9% sodium chloride BOLUS (0 mLs Intravenous Stopped 7/18/22 0258)   insulin aspart (NovoLOG) injection (RAPID ACTING) (8 Units Subcutaneous Given 7/18/22 0135)        Assessments & Plan (with Medical Decision Making)   Patient presents to the emergency department today for the above complaints.  Record was reviewed from recent hospitalization at [facility].    Patient is a type I diabetic and appears to have had very poor glucose control recently.  Initial glucose here in the ED is 468.  Patient is having difficulty telling me exactly how much insulin she is taking at home or even if she is taking her insulin - patient states \"I try to take it\".  She does have a history of cognitive impairment and it is questionable how much she is able to manage her own insulin.    CBC shows a white count of 6.7, hemoglobin is 8.4, down from 10.1 from prior value on 7/6/22. She has no signs of bleeding.  Platelets are within normal limits.  BMP shows creatinine of 1.32, glucose of 449, electrolytes appear to be within normal limits otherwise, normal bicarb.  Ketones are normal at 0.4.  COVID swab was negative, influenza negative.  EKG was done and this demonstrates sinus rhythm with occasional PVCs and PACs, but no sign of arrhythmia or ischemia.  Chest x-ray was read by radiology as a right basilar pneumonia.    We have given the patient IV fluids and insulin here in the ED.  Given her out-of-control blood sugars and current pneumonia, I believe it is reasonable to admit her at this time.  We will treat her for hospital-acquired pneumonia as she was recently in the hospital at [facility].  Cefepime has been ordered (patient reports penicillin allergy so wanted to avoid Zosyn).  She is on room air at this time and not requiring supplemental oxygen.  We will admit her to medicine for continued management of her hospital-acquired pneumonia, " uncontrolled type 1 diabetes.  Suspect that placement might be necessary if she is unable to manage her own insulin regimen given what appears to be cognitive impairment.    I have reviewed the nursing notes. I have reviewed the findings, diagnosis, plan and need for follow up with the patient.    New Prescriptions    No medications on file       Final diagnoses:   Pneumonia of right lower lobe due to infectious organism   Uncontrolled type 1 diabetes mellitus with hyperglycemia (H)   Anemia, unspecified type       --  Jojo Fisher MD  [facility] EMERGENCY DEPARTMENT  7/17/2022     Jojo Fisher MD  07/18/22 0317

## 2022-07-18 NOTE — H&P
[facility]    History and Physical - Hospitalist Service       Date of Admission:  7/17/2022    Assessment & Plan      Yanira Keene is a 77 year old female with a history of Cognitive disorder, CAD, DM1 c/b retinopathy and neuropathy, MDD admitted on 7/17/2022. She presents for cough and high blood sugar found to have pneumonia and hyperglycemia from not taking her insulin. Given her cognitive disorder and concerns for ability to care for herself, it was recommended she be admitted for treatment of pneumonia and optimization of blood sugar with PT/OT evaluations.    Hospital Acquired Pneumonia  - COVID test here negative, at home RAT was positive >1 week ago (when daughter was still in town, but patient is unsure of exact date).   - Continue cefepime    DM1 complicated by neuropathy and retinopathy  - patient not checking pre-meal blood glucose, regularly choosing not to dose glargine, but always give insulin with meals  - endocrine consult  - continue glargine 8u daily  - Insuline 1:12 CHO, 1:50u correction per recent admission to [facility]    MDD  - continue lexapro    CAD s/p stent to LAD  - not on asa, will need to investigate further    Cognitive impairment  - concerns for ability to care for herself given complexity of DM1 and forgetfulness  - PT /OT consult    Euvolemic Hyponatremia  - likely SIADH in the setting of pneumonia, recheck sodium in the AM.     Malnutrition  - nutrition consult    Anemia of chronic disease  - monitor as needed       Diet:   Regular diet  DVT Prophylaxis: Enoxaparin (Lovenox) SQ  Billy Catheter: Not present  Central Lines: None  Cardiac Monitoring: None  Code Status:   DNR/DNI, confirmed with patient, per prior POLST through [facility]    Clinically Significant Risk Factors Present on Admission         # Hyponatremia: Na = 132 mmol/L (Ref range: 133 - 144 mmol/L) on admission, will monitor as appropriate                   Disposition Plan         The patient's care was discussed with the Bedside Nurse and Patient.    Sterling Hanson MD  Hospitalist Service  [facility]  Securely message with the Vocera Web Console (learn more here)  Text page via [employer] Paging/Directory       [phone]  ______________________________________________________________________    Chief Complaint   High blood sugar, cough    History is obtained from the patient, electronic health record and emergency department physician    History of Present Illness   Yanira Keene is a 77 year old female with a hx of cognitive impairment, MDD, CAD, DM1 with retinopathy and neuropathy who presented to the ED on 7/17/2022 for cough and high blood sugar. With patients cognitive impairment and poor memory history is difficult to piece together. Patient has had dry, non productive cough for weeks. She has felt tired and run down. She has not had fevers, chest pain, shortness of breath. She believes her and her daughter, Rebecca, had COVID RAT done about a week ago that were positive. Patient has also had difficult to control blood sugar. She says that she has been checking maybe 1x per day, but she has gotten relaxed with this. She hasn't taken glargine as prescribed in 3 months, often skipping doses or decreasing dose. She always gives carb correction but almost never checks her blood sugar prior to meals. She lives alone, her daughter had been staying with her but had to go back to [place]. She thinks this was about a week ago.     Review of Systems    The 10 point Review of Systems is negative other than noted in the HPI or here.   - occasional R hip pain where she broke her hip previously  - occasional leg cramps  - occasional hypoglycemic episodes    Past Medical History    I have reviewed this patient's medical history and updated it with pertinent information if needed.   No past medical history on file.  Cognitive  Impairment - MRI  showed microvascular disease  CAD - NATALIO stent of LAD   MDD  SNHL of both ears  Osteoporosis  L5-S1 discitis, resolved  DM1 with neuropathy and retinopathy  POLST - DNR, confirmed with patient    Past Surgical History   I have reviewed this patient's surgical history and updated it with pertinent information if needed.  No past surgical history on file.   ORIF of left hip 10/10/2016 and    x3    Social History   I have reviewed this patient's social history and updated it with pertinent information if needed.      Lives alone, daughter was staying with her but has gone back to [place]  - cooks for herself, was relying on meals on wheels, but prefers vegan fare  - was a SW      x2  [place], [place], has autism  Daughter, lives in [place], works in film industry, just returned to [place] a few days ago    Family History     There is a family history of alzheimers    Prior to Admission Medications   None     Allergies   Allergies   Allergen Reactions     Penicillins Rash       Physical Exam   Vital Signs: Temp: 98.9  F (37.2  C) Temp src: Oral BP: (!) 129/90 Pulse: 107   Resp: 16 SpO2: 100 %      Weight: 0 lbs 0 oz    Gen: NAD, sitting comfortably in bed  Eyes: PERRLA, EOMI, conjuctiva clear  Mouth: OP clear, no lesions  Neck: No cervical LAD  CV: RRR, no murmurs, 2+ radial pulses  RESP: CTA bilaterally with coarse sounds and crackles at R base, no w/r/c  Abd: soft, nontender, nondistended  Ext: no edema bilaterally  Neuro: CNII-CNXII intact     Data   Data reviewed today: I reviewed all medications, new labs and imaging results over the last 24 hours. I personally reviewed the chest x-ray image(s) showing R lower consolidation per my read.    Recent Labs   Lab 22  0307 22  0116 22  2331   WBC  --  6.7  --    HGB  --  8.4*  --    MCV  --  90  --    PLT  --  230  --    NA  --  132*  --    POTASSIUM  --  3.8  --    CHLORIDE  --  97  --    CO2  --  26  --     BUN  --  37*  --    CR  --  1.32*  --    ANIONGAP  --  9  --    THERON  --  8.7  --    * 449* 468*     Recent Results (from the past 24 hour(s))   XR Chest Port 1 View    Narrative    EXAM: XR CHEST PORT 1 VIEW  LOCATION: [facility]  DATE/TIME: 7/17/2022 11:47 PM    INDICATION: Cough.  COMPARISON: None.    FINDINGS: The heart size is normal. There is a right basilar infiltrate. Minimal left basilar atelectasis or scarring. The lungs are otherwise clear. No pneumothorax. Degenerative disease and scoliosis in the spine. Old left rib fractures.      Impression    IMPRESSION: Right basilar pneumonia.

## 2022-07-18 NOTE — ED NOTES
Pt asking for something for anxiety. Writer let MD know as well as called her pharmacy and completed the med rec.

## 2022-07-18 NOTE — CONSULTS
Consulted by Halima Lugo with Inpatient Diabetes Management Team to run a test claim for Tresiba 100U.    Patient has pharmacy benefits through [employer]. Per insurance, this medication is covered at $35.00 copay.       Please feel free to contact me with any other test claims, prior authorizations, or insurance questions regarding outpatient medications.     Thanks!      Rosario Lawson [facility] Liaison  Pronouns: She/Her/Hers    [facility]  [address] [place] [address]   [email or web address]  [email or web address]   Phone: [phone]  Pager: [phone]  Fax: [phone]

## 2022-07-18 NOTE — CONSULTS
"NEW INPATIENT DIABETES MANAGEMENT CONSULT  Yanira Keene  Age: 77 year old  MRN # [id number]   YOB: 1945    Reason for Admission pneumonia and optimization of glucose and planning for safe disposition  Reason for Consult: type 1 diabetes with hypoglycemia, cognitive impairment  Consulting Provider: Sterling Hanson MD, Priscilla Tang MD    History of Present Illness:   Yanira Keene is a 77 year old female with a history of Cognitive disorder, CAD, DM1 c/b retinopathy and neuropathy, MDD, hypertension, dyslipidemia, and chronic anemia admitted on 7/17/2022. She presents for cough and high blood sugar found to have pneumonia and hyperglycemia from not taking her insulin. Given her cognitive disorder and concerns for ability to care for herself, it was recommended she be admitted for treatment of pneumonia and optimization of blood sugar with PT/OT evaluations.    Hyperglycemic to 400s on admit, but no DKA.  Glucose improved to 214 at 90 min after 8 units aspart for .  Ate earlier today, last at about 1200.    Now (1300) has orders for glargine 8 every morning, aspart 1 per 12 grams carb and aspart medium resistance correction.  These orders are consistent with [facility] discharge orders.  Since then, pt decreased glargine to just 4 units.  Then on 7/11, was advised to stop it altogether.  She believes she last took 4 units glargine on Friday 7/15 in the afternoon.  She cannot recall her system for dosing Humalog(lispro).  She notes she has \"lost some brain cells\" which make recall difficult.  And that she feels poorly related to her pneumonia at present.      Diabetes Mellitus Type: 1  Duration:    62 years  Diabetic Complications:  +retinopathy, +peripheral neuropathy, CAD  Prior to Admission Diabetes Regimen:      BG monitor/sensor: glucometer at home, awaiting [employer] supplies  Frequency of checks: before meals when remembers  Insulin regimen: Lantus 4 units midday, Humalog " "amounts uncertain  ([facility] discharge summary said Humalog 4-8-- 1 unit per 12 grams carb and 1 per 50 correction)    Usual BG control PTA:  BGs at home 400s immediately PTA, but also with problematic low BG to 50s-60s  Lab Results   Component Value Date    A1C 10.0 07/18/2022       History of DKA: Pt thought yes, when admitted to [facility] earlier this month, but was not in DKA per discharge summary  Able to Detect Hypoglycemia: hx of unawareness-- 2020 note from Angel Baca CNP [facility]--\"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 Most recently, on 4/20/20, Rossana's friend called 911 when she was unable to reach her. The Police and Paramedics responded and had to break in her door. Rossana was found unresponsive on the floor from a severe hypoglycemic episode.     -BG of 30 mg/dl on 5/8/20  \"  Usual Diabetes Care Provider: BE[facility] PCP in [facility], last visit 7/11.  Had endocrinologist in [place] at [facility] (name?).  Scheduled with  endo early August  Primary Care Provider: No primary care provider on file. Deena Eric  Factors Impacting Glucose Control: illness at present, also cognition, weight loss  Current Diet: Orders Placed This Encounter      Combination Diet Regular Diet Adult        10 point ROS completed with pertinent positives and negatives noted in the HPI  Past medical, family and social histories are reviewed and updated.    Social History        Marital Status:  x 2    Place of Residence: [place] x 25 years, now in [facility]    Occupation: retired psychotherapist    Family History  No diabetes    Physical Exam   BP (!) 142/73   Pulse 107   Temp 98.9  F (37.2  C) (Oral)   Resp 16   SpO2 100%   Wt Readings from Last 4 Encounters:   No data found for Wt     Believes she was last around 113 pounds.  General: pleasant, resting on litter, appears fatigued   HEENT: normocephalic, atraumatic. Oral mucous membranes moist.   Lungs: " unlabored respiration, frequent cough  ABD: rounded  Skin:  dry, no obvious lesions  MSK:  moves all extremities  Lymp:  no LE edema   Mental status:  alert, oriented to self, place.  Poor recall.  Easily shifts back to distant past  Psych:   calm and appropriate interaction , easily engaged    Most Recent Laboratory Tests:  Recent Labs   Lab 07/18/22  0116   HGB 8.4*     Recent Labs   Lab 07/18/22  0116   A1C 10.0*     Recent Labs   Lab 07/18/22  0116   CR 1.25*  1.32*     Recent Labs   Lab 07/18/22  0307 07/18/22  0116 07/17/22  2331   * 449* 468*       Assessment: Yanira Keene is a 77 year old female with a history of Cognitive disorder, CAD, Type 1 diabetes c/b retinopathy and neuropathy, MDD, hypertension, dyslipidemia, and chronic anemia admitted on 7/17/2022 for treatment of pneumonia and hyperglycemia and determination of safe disposition given her cognitive disorder.      Plan:    -glargine/ Lantus reduced to 4 units and changed to q24h now (1430)   -aspart/ Novolog 1 unit per 20 CHO coverage-- Max dose 5 units per meal and 3 units per snack   -aspart/ Novolog medium correction scale AC, HS okay for now   -BG monitoring TID AC, HS, 0200   - PRN D5WNS at 75 ml/h in case of BG still low after treatment per hypoglycemia protocol   -hypoglycemia protocol   -carb counting protocol   -pharmacy liaison for [facility]   -diabetes education needs will be assessed closer to discharge-- this has been an ongoing process outpatient, to manage in setting of pt's cognitive decline   -on discharge, will recommend outpatient follow up with [facility]  08/04/2022 Appointment Endocrinology Enzo Comer MD    [address] [place] [address]    [phone] (Work)    [phone] (Fax)        -prescriptions needed on discharge:  Possibly new basal insulin, ensure glucometer supplies until pt's sensor arrives from [place]    Discussed plan of care with patient, nursing, and primary team  messaged.  Thank you for this consult; Inpatient Diabetes will continue to follow.     To contact Endocrine Diabetes service:   From 8AM-4PM: page inpatient diabetes provider that is following the patient  For questions or updates from 4PM-8AM: page the diabetes job code for on call fellow: [phone]    I spent a total of 90 minutes bedside and on the inpatient unit managing the glycemic care of Yaniraog Keene. Over 50% of my time on the unit was spent counseling the patient  and/or coordinating care regarding acute glycemic management.  See note for details.    Halima MICHAEL CNS BC-ADM  Inpatient Diabetes Management Service

## 2022-07-18 NOTE — ED NOTES
Pt took at home COVID test with daughter. Both pt and daughter tested positive. Pt has a weak dry cough. Pt is 97% on room air. Pt reports some SOB and difficulty breathing with activity and at rest. Pt is also DM type 1 and has had difficulty controlling BGs recently.

## 2022-07-18 NOTE — ED NOTES
Attempted to call report and floor stated they are not taking her at this time, will call back when they can.

## 2022-07-18 NOTE — PROGRESS NOTES
Admitted/transferred from: [facility]  Reason for admission/transfer: MS Tele Overflow. Hyperglycemic. Hyponatremic. PNA.  Patient status upon admission/transfer: Alert, pleasant, oriented only to situation and person.   Interventions:   Plan: Manage blood glucose. Treat pneumonia. Safety.   2 RN skin assessment: completed by Ely RN & Rossy RN  Result of skin assessment and interventions/actions: Small scabs noted on lower extremities.    Height, weight, drug calc weight: Done  Patient belongings (see Flowsheet - Adult Profile for details): In patient's room in cupboard.   MDRO education (if applicable): NA

## 2022-07-18 NOTE — PHARMACY-ADMISSION MEDICATION HISTORY
Admission Medication History Completed by Pharmacy    See [facility] Admission Navigator for allergy information, preferred outpatient pharmacy, prior to admission medications and immunization status.     Medication History Sources:     Patient    Changes made to PTA medication list (reason):    Added: None    Deleted: None    Changed: None    Additional Information:    Patient reports not starting Metoprolol prescription and was only taking Lexapro occasionally.      Prior to Admission medications    Medication Sig Last Dose Taking? Auth Provider Long Term End Date   aspirin (ASA) 81 MG chewable tablet Take 81 mg by mouth daily 7/14/2022 at Unknown time Yes Reported, Patient     escitalopram (LEXAPRO) 10 MG tablet Take 1 tablet by mouth daily Past Week at Unknown time Yes Unknown, Entered By History Yes 9/11/22   insulin glargine (LANTUS PEN) 100 UNIT/ML pen Inject 4 Units Subcutaneous At Bedtime 7/18/2022 at Unknown time Yes Reported, Patient Yes    metoprolol succinate ER (TOPROL XL) 25 MG 24 hr tablet Take 25 mg by mouth daily Did not start taking Unknown at Unknown time Yes Reported, Patient Yes    insulin lispro (HUMALOG KWIKPEN) 100 UNIT/ML (1 unit dial) KWIKPEN INJECT 4 TO 8 UNITS 3 TIMES DAILY BEFORE MEALS CHO  RATIO 1:12 WITH CF 1:50>  150 MG/DL MAXIMUM DAILY  DOSE: 35 UNITS   Unknown, Entered By History Yes        Date completed: 07/18/22    Medication history completed by: Saurabh Dodd RPH

## 2022-07-19 NOTE — PLAN OF CARE
Neuro: A&Ox3-4 with intermitted confusion, easily reorients and self-aware of occasional disconnects. Denies pain.   C/V: BP WNL. HR , no edema noted, +pulses. Afebrile  Pulm: frequent dry/non-productive cough. MD notified and PRN Tessalon ordered/given. Remains on RA overnight with O2 Sats >92% on continuous monitoring.  GI: Adequate PO intake, elevated glucose treated with subcutaneous insulin. 2 BM's overnight  : continent voids to BR with   M/S: FWW/gait belt staff assist    Goal Outcome Evaluation:    Plan of Care Reviewed With: patient     Overall Patient Progress: improving    Deanna Christensen RN

## 2022-07-19 NOTE — PROGRESS NOTES
Diabetes Consult Daily  Progress Note          Assessment/Plan:       Yanira Keene is a 77 year old female with a history of Cognitive disorder, CAD, Type 1 diabetes c/b retinopathy and neuropathy and hypoglycemia unawareness (recent hx of increased hypoglycemia AND hospitalization for hyperglycemia), MDD, hypertension, dyslipidemia, and chronic anemia admitted on 7/17/2022 for treatment of pneumonia and hyperglycemia and determination of safe disposition given her cognitive disorder.        Plan:                 -glargine/ Lantus 4 units  q24h now (1430)--> likely increase to 8 units, and shift to noon admin today-- to be confirmed after pre-lunch glucose                -aspart/ Novolog 1 unit per 20 CHO coverage--> increase to 1 unit per 14 grams                -aspart/ Novolog medium correction scale AC, HS  And add 0200 correction                -BG monitoring TID AC, HS, 0200                - PRN D5WNS at 75 ml/h in case of BG still low after treatment per hypoglycemia protocol                -hypoglycemia protocol                -carb counting protocol                [facility]                -diabetes education needs will be assessed closer to discharge-- this has been an ongoing process outpatient, to manage in setting of pt's cognitive decline                -on discharge, will recommend outpatient follow up with [facility]  08/04/2022 Appointment Endocrinology Enzo Comer MD    [address] [place] [address]    [phone] (Work)    [phone] (Fax)                      -prescriptions needed on discharge:  Possibly new basal insulin, ensure glucometer supplies until pt's sensor arrives from [place]           Plan discussed with patient, bedside RN,---> primary team messaged via [employer]           Interval History:     The last 24 hours progress and nursing notes reviewed.    Eating overnight-- pt does not endorse carb  intake overnight.  Night RN reported to days that pt was anxious overnight.  There was no report of intake.  No BG check at 0200.  AM check 400s, no acidosis.  Today, Rossana complains of fatigue and wanting her family to come.  Efforts to engage her in insulin discussion (yesterday she asked to be involved in decisions) were limitedly successful.          Recent Labs   Lab 07/19/22  0807 07/19/22  0729 07/18/22  2215 07/18/22  1651 07/18/22  1552 07/18/22  1356   * 451* 340* 245* 259* 404*           Nutrition:     Orders Placed This Encounter      Combination Diet Regular Diet Adult            PTA Regimen:   Pt reports last glargine 4 units in afternoon, maybe on 7/15/22  Humalog dosing unclear  **waiting on [employer] supplies            Review of Systems:   See interval hx          Medications:   no steroid  Cefepime         Physical Exam:     Gen: arouse to voice, in NAD but fatigued  HEENT:  hearing intact to conversational volume  Resp: Unlabored, less freq cough  Neuro: oriented to self and place  Psych: anxious mood  /62   Pulse 105   Temp 98.3  F (36.8  C) (Oral)   Resp 18   Ht 1.524 m (5')   Wt 51.1 kg (112 lb 10.5 oz)   SpO2 97%   BMI 22.00 kg/m               Data:     Lab Results   Component Value Date    A1C 10.0 07/18/2022          No results found for: HEBMP, [id number], CREAT      CBC RESULTS:   Recent Labs   Lab Test 07/19/22  0729   WBC 6.4   RBC 2.94*   HGB 8.9*   HCT 27.7*   MCV 94   MCH 30.3   MCHC 32.1   RDW 13.1        Recent Labs   Lab Test 07/19/22  0807 07/19/22  0729 07/18/22  0307 07/18/22  0116   NA  --  136  --  132*   POTASSIUM  --  4.2  --  3.8   CHLORIDE  --  104  --  97   CO2  --  23  --  26   ANIONGAP  --  9  --  9   * 451*   < > 449*   BUN  --  29  --  37*   CR  --  0.73  --  1.25*  1.32*   THERON  --  8.5  --  8.7    < > = values in this interval not displayed.     Liver Function Studies - No results for input(s): PROTTOTAL, ALBUMIN, BILITOTAL,  ALKPHOS, AST, ALT, BILIDIRECT in the last 85863 hours.  No results found for: INR      I spent a total of 35 minutes bedside and on the inpatient unit managing the glycemic care of Yanira Keene. Over 50% of my time on the unit was spent counseling the patient  and/or coordinating care regarding acute glycemic management and discharge planning.  See note for details.        Halima Lugo APRN CNS   To contact Endocrine Diabetes service:   From 8AM-4PM: page inpatient diabetes provider that is following the patient that day (see filed or incomplete progress notes/consult notes).  If uncertain of provider assignment: page job code [phone].  For questions or updates from 4PM-8AM: page the diabetes job code for on call fellow: [phone]    Please notify inpatient diabetes service if changes are planned that will impact glycemia, such as changes to steroids, enteral feeding, parenteral feeding, dextrose fluids or procedures requiring prolonged NPO status.

## 2022-07-19 NOTE — PROGRESS NOTES
Pt alert with worsening confusion throughout the day. Pt can answer orientation questions correctly but makes statements and comments that do not follow conversation. Pt also needs frequent re-orientation and reminders of place and purpose of stay. Writer notifed provider of concerns for development of hospital delirium and NOC will initiate CAM and delirium protocol.    CV WNL, pt on RA ex: for during significant coughing episode where SP02 dipped and sustained in the upper 80's. Pt reported SOB and LS were shallow and diminished bilaterally with inspiration wheezes. Tussin administered with some effect in cough reduction. Writer contacted provider and initiated PRN duoneb with effect. Pt now on RA and resting comfortably.    BG continues to be OOR and appetite is good. Next BG due at 0200 Pt had 2 BM this shift and ambulates well to the  with SBA.     Continue to monitor delirium status, BG, and pulmonary status.    Ebony Martin, RN 7/19/22

## 2022-07-19 NOTE — PROGRESS NOTES
"[facility], [place]   Internal Medicine Daily Note           Interval History/Events     Hand off taken from Dr. Tang  Overnight events reviewed  Reports doing well  No nausea, vomiting, chest pain, shortness of breath  No fever, chills       Review of Systems        4 point ROS including Respiratory, CV, GI and , other than that noted above is negative      Medications   I have reviewed current medications  in the \"current medication\" section of Epic.  Relevant changes include:     Physical Exam   General:       Vital signs:    Blood pressure (!) 126/92, pulse 105, temperature 98.3  F (36.8  C), temperature source Oral, resp. rate 21, height 1.524 m (5'), weight 51.1 kg (112 lb 10.5 oz), SpO2 94 %.  Estimated body mass index is 22 kg/m  as calculated from the following:    Height as of this encounter: 1.524 m (5').    Weight as of this encounter: 51.1 kg (112 lb 10.5 oz).      Intake/Output Summary (Last 24 hours) at 7/19/2022 1509  Last data filed at 7/19/2022 0830  Gross per 24 hour   Intake 603 ml   Output --   Net 603 ml        Constitutional: Laying in bed in no acute distress  Eye: No icterus, no pallor  Mouth/ENT: No oral mucosa  Cardiovascular: S1, S2 normal.   Respiratory: B/L CTA  GI: Soft, NT, BS+  :   Neurology: Alert, awake, and oriented.   Psych:   MSK:   Integumentary:   Heme/Lymph/Imm:      Laboratory and Imaging Studies     I have reviewed  laboratory and imaging studies in the Epic. Pertinent findings are as below:    BMP  Recent Labs   Lab 07/19/22  1138 07/19/22  0807 07/19/22  0729 07/18/22  2215 07/18/22  0307 07/18/22  0116   NA  --   --  136  --   --  132*   POTASSIUM  --   --  4.2  --   --  3.8   CHLORIDE  --   --  104  --   --  97   THERON  --   --  8.5  --   --  8.7   CO2  --   --  23  --   --  26   BUN  --   --  29  --   --  37*   CR  --   --  0.73  --   --  1.25*  1.32*   * 406* 451* 340*   < > 449*    < > = values in this interval not displayed. "     CBC  Recent Labs   Lab 07/19/22  0729 07/18/22  0116   WBC 6.4 6.7   RBC 2.94* 2.68*   HGB 8.9* 8.4*   HCT 27.7* 24.2*   MCV 94 90   MCH 30.3 31.3   MCHC 32.1 34.7   RDW 13.1 12.9    230     INRNo lab results found in last 7 days.  LFTsNo lab results found in last 7 days.   PANCNo lab results found in last 7 days.        Impression/Plan      77 year old female with a history of Cognitive disorder, CAD, DM1 c/b retinopathy and neuropathy, MDD admitted on 7/17/2022. She presents for cough and high blood sugar found to have pneumonia and hyperglycemia from not taking her insulin. Given her cognitive disorder and concerns for ability to care for herself, it was recommended she be admitted for treatment of pneumonia and optimization of blood sugar with PT/OT evaluations.     # Hospital Acquired Pneumonia:   - COVID test here negative, at home RAT was positive >1 week ago (when daughter was still in town, but patient is unsure of exact date).   - Continue cefepime. Plan for 7 day treatment  - Add Duoneb prn     # DM1 complicated by neuropathy and retinopathy:   - patient not checking pre-meal blood glucose, regularly choosing not to dose glargine, but always give insulin with meals  - endocrine consulted. Appreciate recommendations     # MDD  - continue lexapro     # CAD s/p stent to LAD  PTA on Aspirin, and Metoprolol XL. But not taking      # Cognitive impairment  - concerns for ability to care for herself given complexity of DM1 and forgetfulness  - PT /OT consult\  - Patient little confused, will add seroquel low dose  - Delirium prevention measures       # Euvolemic Hyponatremia  - likely SIADH in the setting of pneumonia  Na level normal on 07/19/2022     # Malnutrition  - nutrition consult     # Anemia of chronic disease  Hg 8.9 gm/dl on 07/19/2022  - monitor as needed        # Diet:   Regular diet  # DVT Prophylaxis: Enoxaparin (Lovenox) SQ  # Billy Catheter: Not present  # Central Lines: None  #  Cardiac Monitoring: None  # Code Status:   DNR/DNI, confirmed with patient, per prior POLST through health [facility]     Pt's care was discussed with bedside RN, patient and  during Care Team Rounds.               Terrence Daley MD  Hospitalist ( Internal medicine)  Pager: [phone]

## 2022-07-19 NOTE — PROGRESS NOTES
Penicillin/Cephalosporin Allergy Assessment    Antimicrobial Stewardship Program - A joint venture between [facility] and  Physicians to optimize antibiotic management.     Yanira Keene was identified for allergy assessment during this admission due to a documented allergy to a penicillin or cephalosporin antibiotic.  A detailed allergy history interview was completed on 07/19/22 to assess the appropriateness of the documented allergy.    Allergy History:   Question Response   What was the name of agent which caused the event?  Exact name unknown, patient cannot remember   What is the medication's indication?  Post-surgery prophylaxis   How was the medication given/taken?  Oral   How long ago was the reaction?  40 years ago in the 1980s, when her son was about 4   What was the symptoms of the event?  Hives - started on the top of her head and went into the mouth, very aggravating - throat was very uncomfortable - drove herself to the ED   How long after taking the medication did it take for the symptoms to begin?   Not long at all, exact time unknown   How was the reaction treated?     She cannot remember if she was admitted, but they did treat it and it took a while for them to resolve it.    Did you ever experience symptoms like this before?  No   Have you received the medication again without a reaction?  No   Have you received a similar medication without a reaction?  Amoxicillin and Augmentin don't sound familiar, neither does Keflex. She is wary of antibiotics because of her reaction   Can you name antibiotics that you've tolerated?  There have been several, but she doesn't remember any of their names  Has tolerated cefepime during this admission (7/2022)   Dates tolerated (if applicable):  7/2022     Information Source:   Patient    Additional Information: Called patient on hospital bed phone to discuss her penicillin allergy. She reported having hives not long after taking an oral  "penicillin antibiotic prescribed for \"post-surgery\" back in the 1980s. She was unable to remember the specific name of the antibiotic, but she clearly remembered the reaction. Hives started at the top of her head and spread down into the mouth, making her throat \"feel uncomfortable.\" She drove herself to the ED where they treated her for the reaction. She did not remember the treatment, but says it took a while. She said she is wary of antibiotics after this experience and does not recognize the names Augmentin, amoxicillin, or Keflex/cephalexin. She knows she has tolerated other antibiotics, but does not remember the names.    Risk Stratification:  This patient's reaction to a penicillin antibiotic, while moderate to severe, occurred about 40 years ago in the 1980s. Evidence has shown that 80% of patients with a severe IgE-mediated reaction to penicillins will lose reactivity after 10 years. Given that it has been 40 years, this patient has a low risk of serious reaction to penicillins and associated beta-lactam antibiotics. Patient has also tolerated cefepime during this admission.     Recommendation:  1) Patient should be able to tolerate cephalosporins and can receive cephalosporins with little to no risk of reaction.  2) Patient is considered low risk for reaction for penicillins. If patient were to need a penicillin antibiotic, could consider a test dose with monitoring (vitals every 15 minutes for the first hour) for immediate reactions.  3) Allergy reaction was updated to to reflect reaction of hives and throat discomfort.    Please contact a pharmacist with any further allergy-related questions or concerns.    Zo Reynolds, Pharm.D. Candidate 2023  Phone [phone]    "

## 2022-07-19 NOTE — UTILIZATION REVIEW
Admission Status; Secondary Review Determination       Under the authority of the Utilization Management Committee, the utilization review process indicated a secondary review on the above patient. The review outcome is based on review of the medical records, discussions with staff, and applying clinical experience noted on the date of the review.     (x) Inpatient Status Appropriate - This patient's medical care is consistent with medical management for inpatient care and reasonable inpatient medical practice.     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  She presented to the emergency department on 7/17/2022 for evaluation of cough and concern about COVID-19.  She lives alone but her daughter had been visiting.  Her daughter recently went back to [place].  Evidently, her her daughter took a rapid COVID-19 test which came back positive.  Because of this and her cough the patient came to the emergency department for evaluation.  She was found to have tachycardia and low-grade temperature elevation.  COVID-19 testing was negative but she did have a right lower lobe infiltrate consistent with pneumonia.  She also had acute kidney injury with BUN 37 creatinine 1.32.  She had hyperglycemia with blood sugar of 400s.  She was admitted to the hospital for treatment of pneumonia, acute kidney injury, and severe hyperglycemia.  She was recently hospitalized at [facility] so was treated for healthcare associated pneumonia with cefepime.  She met sepsis criteria of tachycardia and acute kidney injury on presentation.  She has been seen by endocrinology and continues to have hyperglycemia.  Inpatient admission is appropriate for ongoing treatment of healthcare associated pneumonia and severe hyperglycemia in this type I diabetic who is poorly compliant with her diabetes regimen.  There was  also concern about worsening cognitive impairment and physical therapy and Occupational Therapy consults have been requested.    At the time of admission with the information available to the attending physician more than 2 nights Hospital complex care was anticipated, based on patient risk of adverse outcome if treated as outpatient and complex care required. Inpatient admission is appropriate based on the Medicare guidelines.     This document was produced using voice recognition software       The information on this document is developed by the utilization review team in order for the business office to ensure compliance. This only denotes the appropriateness of proper admission status and does not reflect the quality of care rendered.   The definitions of Inpatient Status and Observation Status used in making the determination above are those provided in the CMS Coverage Manual, Chapter 1 and Chapter 6, section 70.4.   Sincerely,     Mitul Mercedes MD    Utilization Review  Physician Advisor  [facility].

## 2022-07-20 NOTE — PROGRESS NOTES
"[facility], [place]   Internal Medicine Daily Note           Interval History/Events     Overnight events reviewed  Feels better compared to yesterday  No nausea, vomiting, chest pain, shortness of breath  No fever, chills       Review of Systems        4 point ROS including Respiratory, CV, GI and , other than that noted above is negative      Medications   I have reviewed current medications  in the \"current medication\" section of Epic.  Relevant changes include:     Physical Exam   General:       Vital signs:    Blood pressure 102/65, pulse 97, temperature 97.8  F (36.6  C), temperature source Oral, resp. rate 27, height 1.524 m (5'), weight 50.2 kg (110 lb 10.7 oz), SpO2 94 %.  Estimated body mass index is 21.61 kg/m  as calculated from the following:    Height as of this encounter: 1.524 m (5').    Weight as of this encounter: 50.2 kg (110 lb 10.7 oz).      Intake/Output Summary (Last 24 hours) at 7/19/2022 1509  Last data filed at 7/19/2022 0830  Gross per 24 hour   Intake 603 ml   Output --   Net 603 ml        Constitutional: Laying in bed in no acute distress  Eye: No icterus, no pallor  Mouth/ENT: No oral mucosa  Cardiovascular: S1, S2 normal.   Respiratory: B/L CTA  GI: Soft, NT, BS+  :   Neurology: Alert, awake, and oriented.   Psych:   MSK:   Integumentary:   Heme/Lymph/Imm:      Laboratory and Imaging Studies     I have reviewed  laboratory and imaging studies in the Epic. Pertinent findings are as below:    BMP  Recent Labs   Lab 07/20/22  1144 07/20/22  0742 07/20/22  0641 07/20/22  0204 07/19/22  0807 07/19/22  0729 07/18/22  0307 07/18/22  0116   NA  --   --  143  --   --  136  --  132*   POTASSIUM  --   --  3.7  --   --  4.2  --  3.8   CHLORIDE  --   --  110*  --   --  104  --  97   THERON  --   --  8.9  --   --  8.5  --  8.7   CO2  --   --  28  --   --  23  --  26   BUN  --   --  19  --   --  29  --  37*   CR  --   --  0.70  --   --  0.73  --  1.25*  1.32*   * " 176* 166* 195*   < > 451*   < > 449*    < > = values in this interval not displayed.     CBC  Recent Labs   Lab 07/20/22  0641 07/19/22  0729 07/18/22  0116   WBC 7.7 6.4 6.7   RBC 2.89* 2.94* 2.68*   HGB 8.8* 8.9* 8.4*   HCT 26.9* 27.7* 24.2*   MCV 93 94 90   MCH 30.4 30.3 31.3   MCHC 32.7 32.1 34.7   RDW 13.2 13.1 12.9    261 230     INRNo lab results found in last 7 days.  LFTsNo lab results found in last 7 days.   PANCNo lab results found in last 7 days.        Impression/Plan      77 year old female with a history of Cognitive disorder, CAD, DM1 c/b retinopathy and neuropathy, MDD admitted on 7/17/2022. She presents for cough and high blood sugar found to have pneumonia and hyperglycemia from not taking her insulin. Given her cognitive disorder and concerns for ability to care for herself, it was recommended she be admitted for treatment of pneumonia and optimization of blood sugar with PT/OT evaluations.     # Hospital Acquired Pneumonia:   - COVID test here negative, at home RAT was positive >1 week ago (when daughter was still in town, but patient is unsure of exact date).   Required 1-2 liters intermittently on 07/19, on RA on 07/20  - Continue cefepime. Plan for 7 day treatment  - Add Duoneb prn     # DM1 complicated by neuropathy and retinopathy:   - patient not checking pre-meal blood glucose, regularly choosing not to dose glargine, but always give insulin with meals  - endocrine consulted. Appreciate recommendations. Discussed with DM NP on 07/20/2022     # MDD  - continue lexapro     # CAD s/p stent to LAD  PTA on Aspirin, and Metoprolol XL. But not taking      # Cognitive impairment  # Probable delirium d/t infection, metabolic abnormality  - concerns for ability to care for herself given complexity of DM1 and forgetfulness  - PT /OT consulted  - Patient little confused, will add seroquel low dose  - Delirium prevention measures  - Treat infection, hyperglycemia       # Euvolemic  Hyponatremia  - likely SIADH in the setting of pneumonia  Na level normal on 07/19/2022     # Moderate malnutrition in the context of acute on chronic illness  supplemental liquids provided - Jorge and Antonette Jeffery ordered to trial   Monitor labs, intakes, and weight trends     # Anemia of chronic disease  Hg 8.9 gm/dl on 07/19/2022  - monitor as needed        # Diet:   Regular diet  # DVT Prophylaxis: Enoxaparin (Lovenox) SQ  # Billy Catheter: Not present  # Central Lines: None  # Cardiac Monitoring: None  # Code Status:   DNR/DNI, confirmed with patient, per prior POLST through [facility]     Pt's care was discussed with bedside RN, patient and  during Care Team Rounds.               Terrnece Daley MD  Hospitalist ( Internal medicine)  Pager: [phone]

## 2022-07-20 NOTE — PROGRESS NOTES
/65 (BP Location: Left arm, Patient Position: Semi-Whelan's)   Pulse 97   Temp 97.8  F (36.6  C) (Oral)   Resp 27   Ht 1.524 m (5')   Wt 50.2 kg (110 lb 10.7 oz)   SpO2 94%   BMI 21.61 kg/m      Neuro: Pt is A&O x3 intermittently confused to situation and exact date. No complaints of pain, baseline numbness in BLE.   Cardiac: NSR, normotensive  Resp: RA, persistent dry cough, received prn tessalon 2x.  GI: Tolerating regular diet, good appetite, supplements added after dietician visit. Declined morning miralax. Had 2 BM's today, a soft one and slightly loose one.   :  Intermittently incontinent, voiding spontaneously  Integ: L thigh scab  IV Access: 20 g L forearm  Other: Reciving cefepime 2g q 12hr for pneumonia 7/18-7/23. Lantus increased from 7 units to 8 units q 24 hours. Gave updates to daughter Niya in [place] at 1720.

## 2022-07-20 NOTE — PROGRESS NOTES
"CLINICAL NUTRITION SERVICES - ASSESSMENT NOTE     Nutrition Prescription    RECOMMENDATIONS FOR MDs/PROVIDERS TO ORDER:  Encourage oral intake. Provide snacks/supplements PRN    Malnutrition Status:    Moderate malnutrition in the context of acute on chronic illness    Recommendations already ordered by Registered Dietitian (RD):  supplemental liquids provided - Ensure and Antonette Gil ordered to trial    Future/Additional Recommendations:  Monitor labs, intakes, and weight trends     REASON FOR ASSESSMENT  Yanira Keene is a/an 77 year old female assessed by the dietitian for Reason For Assessment: Patient/Family request (Unsure if meant to be MD consult for malnutrition)    CURRENT NUTRITION ORDERS  Diet: Regular   Intake/Tolerance: % of documented meals    Pt ordering (on average) 1320 kcal and 49 g protein per day per [employer] and with documented intakes is likely meeting estimated needs.    MEDICAL/NUTRITION HISTORY:   Hx of  Cognitive disorder, CAD, DM1 c/b retinopathy and neuropathy, MDD admitted on 7/17/2022. She presents for cough and high blood sugar found to have pneumonia and hyperglycemia from not taking her insulin. Given her cognitive disorder and concerns for ability to care for herself, it was recommended she be admitted for treatment of pneumonia and optimization of blood sugar with PT/OT evaluations.    Pt states she eats inconsistently at home. Enjoys tomatoes and toast with tomatoes, butter and peanut butter. States she finds food she really likes and \"maybe overdoes it\". Per pt has many sores in her mouth and has been coughing for a while as well. Pt having some troubles with memory during visit, repeating herself and having trouble finding words. Pt states she has not tried \"healthy shakes\" but would like to try them    LABS  Labs reviewed: Glucose 451 on 7/19 -> 166 on 7/20    MEDICATIONS  Medications reviewed: insulin    ANTHROPOMETRICS  Height: 152.4 cm (5' 0\")  Most Recent " Weight: 50.2 kg (110 lb 10.7 oz)    IBW: 45.5 kg (110% IBW)  BMI: Normal BMI  Weight History: Pt with 5% weight loss over last year.     Wt Readings from Last Encounters:   07/20/22 50.2 kg (110 lb 10.7 oz)   07/11/22 51.3 kg (113 lb)   09/11/21 51.3 kg (113 lb)   05/12/21 53.1 kg (117 lb)     Dosing Weight: 50 kg - current weight    ASSESSED NUTRITION NEEDS  Estimated Energy Needs: 2440-2959 kcals/day (25 - 30 kcals/kg)  Justification: Maintenance  Estimated Protein Needs: 40-50+ grams protein/day (0.8 - 1+ grams of pro/kg)  Justification: Maintenance  Estimated Fluid Needs: 1 mL/kcal or per provider pending fluid status    MALNUTRITION  % Intake: Unable to assess at this time, meeting needs during admission  % Weight Loss: Weight loss does not meet criteria  Subcutaneous Fat Loss: Facial region and Lower arm:  Mild-moderate  Muscle Loss: Temporal:  MIld, Facial & jaw region:  Mild-moderate, Lower arm  (forearm):  Moderate and Dorsal hand:  Moderate  Fluid Accumulation/Edema: None noted  Malnutrition Diagnosis: Moderate malnutrition in the context of acute on chronic illness    NUTRITION DIAGNOSIS  Inadequate oral intake related to poor/inconsistent appetite/intake as evidenced by evident muscle and fat wasting.      PHYSICAL FINDINGS  See malnutrition section below.    INTERVENTIONS  Implementation  Nutrition Interventions: supplemental liquids provided    Goals  Nutrition Goals:: PO  PO Goal:: PO >75%     Monitoring/Evaluation  Progress toward goals will be monitored and evaluated per protocol.    Eli Zendejas MS, RDN, LDN  RD pager: [phone]

## 2022-07-20 NOTE — PLAN OF CARE
Goal Outcome Evaluation:    Patient confused, disorientated to place, time, situation. Bed alarm on.   Patient received adequate sleep. Room air-2L NC. Vital signs stable, afebrile.   Denies pain or nausea. Incontinence of urine at times. Up to commode overnight x2. Voiding, no BM.   Access: L PIVx1

## 2022-07-20 NOTE — PLAN OF CARE
/67 (BP Location: Right arm)   Pulse 98   Temp 98.2  F (36.8  C) (Oral)   Resp 29   Ht 1.524 m (5')   Wt 51.1 kg (112 lb 10.5 oz)   SpO2 94%   BMI 22.00 kg/m      Pt alert, disoriented to time and place. Intermittent confusion. Denies pain. Up w/ SBA and walker. Dry frequent cough, utilizing PRN tessalon. Blood sugar 281 at bedtime. Can be incontinent of urine. Frequently reminded to use call light for assistance.

## 2022-07-20 NOTE — PROGRESS NOTES
Diabetes Consult Daily  Progress Note          Assessment/Plan:       Yanira Keene is a 77 year old female with a history of Cognitive disorder, CAD, Type 1 diabetes c/b retinopathy and neuropathy and hypoglycemia unawareness (recent hx of increased hypoglycemia AND hospitalization for hyperglycemia), MDD, hypertension, dyslipidemia, and chronic anemia admitted on 7/17/2022 for treatment of pneumonia and hyperglycemia and determination of safe disposition given her cognitive disorder.        Plan:                 -glargine/ Lantus 8 units  q24h now (1200)--> decrease to 7 units due to pt low BG fear/potential for increased insulin sensitivity with activity today                -aspart/ Novolog 1 unit per 14 CHO coverage                -aspart/ Novolog medium correction scale AC, HS  [id number] correction                -BG monitoring TID AC, HS, 0200                - PRN D5WNS at 75 ml/h in case of BG still low after treatment per hypoglycemia protocol                -hypoglycemia protocol                -carb counting protocol                -pharmacy liaison for [employer]--> 35.00 copay (to be discussed w/ pt once it is clinically appropriate to consider change in basal insulin)                -diabetes education needs will be assessed closer to discharge-- this has been an ongoing process outpatient, to manage in setting of pt's cognitive decline                -on discharge, will recommend outpatient follow up with [facility]  08/04/2022 Appointment Endocrinology Enzo Comer MD    [address] [place] [address]    [phone] (Work)    [phone] (Fax)                      -prescriptions needed on discharge:  Possibly new basal insulin, ensure glucometer supplies until pt's sensor arrives from [place].  (pt would need to ask family to check her deliveries or call [place])           Plan discussed with patient, bedside RN and  primary team            Interval  History:     The last 24 hours progress and nursing notes reviewed.  Rossana slept better overnight.  Less anxious and more alert today.  Reviewed insulin dosing.  The thought of 8 untis of glargine makes her quite nervous-- she had low BG on this dose at home in the past.  She thinks she'll do some walking today.  Feels quite fatigued, chest sx maybe less bothersome.  Per primary, medically ready in another couple days.  Then expecting placement?            Recent Labs   Lab 07/20/22  0742 07/20/22  0641 07/20/22  0204 07/19/22  2138 07/19/22  1850 07/19/22  1138   * 166* 195* 281* 288* 326*           Nutrition:     Orders Placed This Encounter      Combination Diet Regular Diet Adult            PTA Regimen:   Pt reports last glargine 4 units in afternoon, maybe on 7/15/22  Humalog dosing unclear  **waiting on [employer] supplies            Review of Systems:   See interval hx          Medications:   no steroid  Cefepime         Physical Exam:     Gen: alert, in NAD but fatigued  HEENT:  hearing intact to conversational volume  Resp: Unlabored, no cough observed today  Neuro: oriented to self and place  Psych: calm mood overall, worried about low BG risk  /65 (BP Location: Left arm, Patient Position: Semi-Whelan's)   Pulse 97   Temp 97.8  F (36.6  C) (Oral)   Resp 27   Ht 1.524 m (5')   Wt 50.2 kg (110 lb 10.7 oz)   SpO2 94%   BMI 21.61 kg/m               Data:     Lab Results   Component Value Date    A1C 10.0 07/18/2022          No results found for: HEBMP, [id number], CREAT    Recent Labs   Lab Test 07/20/22  0742 07/20/22  0641 07/19/22  0807 07/19/22  0729   NA  --  143  --  136   POTASSIUM  --  3.7  --  4.2   CHLORIDE  --  110*  --  104   CO2  --  28  --  23   ANIONGAP  --  5  --  9   * 166*   < > 451*   BUN  --  19  --  29   CR  --  0.70  --  0.73   THERON  --  8.9  --  8.5    < > = values in this interval not displayed.     CBC RESULTS: Recent Labs   Lab Test 07/20/22  0641   WBC  7.7   RBC 2.89*   HGB 8.8*   HCT 26.9*   MCV 93   MCH 30.4   MCHC 32.7   RDW 13.2            I spent a total of 35 minutes bedside and on the inpatient unit managing the glycemic care of Yanira Keene. Over 50% of my time on the unit was spent counseling the patient  and/or coordinating care regarding acute glycemic management and discharge planning.  See note for details.        Halima Lugo APRN CNS   To contact Endocrine Diabetes service:   From 8AM-4PM: page inpatient diabetes provider that is following the patient that day (see filed or incomplete progress notes/consult notes).  If uncertain of provider assignment: page job code [phone].  For questions or updates from 4PM-8AM: page the diabetes job code for on call fellow: [phone]    Please notify inpatient diabetes service if changes are planned that will impact glycemia, such as changes to steroids, enteral feeding, parenteral feeding, dextrose fluids or procedures requiring prolonged NPO status.

## 2022-07-20 NOTE — PLAN OF CARE
Goal Outcome Evaluation:    Plan of Care Reviewed With: patient     Overall Patient Progress: improving    Outcome Evaluation: Patient with assumed poor intake prior to admission, now with adequate PO intakes during admission

## 2022-07-21 NOTE — PROGRESS NOTES
07/21/22 1005   Quick Adds   Type of Visit Initial PT Evaluation       Present no   Language English   Living Environment   People in Home alone   Current Living Arrangements apartment   Home Accessibility no concerns   Transportation Anticipated car, drives self   Living Environment Comments Pt reported living in a 4th floor apartment, has elevator access.   Self-Care   Usual Activity Tolerance moderate   Current Activity Tolerance fair   Regular Exercise No   Equipment Currently Used at Home cane, straight   Fall history within last six months yes   Number of times patient has fallen within last six months 4  (pt reported falling due to being clumsy)   Activity/Exercise/Self-Care Comment Pt reported being independent with ADLs a baseline, does her own grocery shopping.   General Information   Onset of Illness/Injury or Date of Surgery 07/18/22   Referring Physician Terrence Daley MD   Patient/Family Therapy Goals Statement (PT) Goal not verbalized.   Pertinent History of Current Problem (include personal factors and/or comorbidities that impact the POC) Per chart review: Pt is a 77 year old female with a history of Cognitive disorder, CAD, DM1 c/b retinopathy and neuropathy, MDD admitted on 7/17/2022. She presents for cough and high blood sugar found to have pneumonia and hyperglycemia from not taking her insulin.   Existing Precautions/Restrictions fall   Heart Disease Risk Factors Diabetes   General Observations Pt supine in bed at start of PT evaluation eating breakfast   Cognition   Affect/Mental Status (Cognition) confused   Orientation Status (Cognition) person   Follows Commands (Cognition) follows multi-step commands   Pain Assessment   Patient Currently in Pain Yes, see Vital Sign flowsheet  (right knee pain)   Integumentary/Edema   Integumentary/Edema no deficits were identifed   Posture    Posture Forward head position;Protracted shoulders   Range of Motion (ROM)   Range of  Motion ROM is WFL   Strength (Manual Muscle Testing)   Strength (Manual Muscle Testing) strength is WFL   Strength Comments Pt demonstrated functional strength but is deconditioned   Bed Mobility   Comment, (Bed Mobility) Supine to sitting at EOB with HOB elevated and SBA x 1.   Transfers   Comment, (Transfers) Sit to/from standing from EOB with FWW and CGA x 1.   Gait/Stairs (Locomotion)   Comment, (Gait/Stairs) Pt ambulated 20' with FWW and CGA x 1.   Balance   Balance Comments Pt demonstrated good sitting balance at EOB and fair standing balance with FWW.   Sensory Examination   Sensory Perception Comments Pt reported having tingling in toes and legs bilaterally.   Clinical Impression   Criteria for Skilled Therapeutic Intervention Yes, treatment indicated   PT Diagnosis (PT) Weakness and impaired functional mobilty   Influenced by the following impairments Pain, deconditioning and pneumonia.   Functional limitations due to impairments Impaired bed mobility, transfers, and gait.   Clinical Presentation (PT Evaluation Complexity) Stable/Uncomplicated   Clinical Presentation Rationale Per clinical judgement   Clinical Decision Making (Complexity) low complexity   Planned Therapy Interventions (PT) balance training;bed mobility training;gait training;transfer training;strengthening   Anticipated Equipment Needs at Discharge (PT)   (Will continue to assess discharge needs, may need FWW.)   Risk & Benefits of therapy have been explained evaluation/treatment results reviewed;care plan/treatment goals reviewed;risks/benefits reviewed;current/potential barriers reviewed;patient   PT Discharge Planning   PT Discharge Recommendation (DC Rec) Transitional Care Facility   PT Rationale for DC Rec Pt would benefit from TCU for LE strengthening and increased independence with all functional mobility.  Pt at this time is not safe to discharge to home.  Pt has had multiple falls at home.   PT Brief overview of current status Pt  is assist of 1 with FWW.   Total Evaluation Time   Total Evaluation Time (Minutes) 14   Physical Therapy Goals   PT Frequency 5x/week   PT Predicted Duration/Target Date for Goal Attainment 07/28/22   PT Goals Transfers;Bed Mobility;Gait   PT: Bed Mobility Independent;Supine to/from sit   PT: Transfers Modified independent;Sit to/from stand;Bed to/from chair;Assistive device   PT: Gait Modified independent;Greater than 200 feet;Assistive device   PT: Goal 1 Pt will be independent with LE strengthening HEP in order to increase strength for transfers/gait.

## 2022-07-21 NOTE — PROGRESS NOTES
07/21/22 1200   Quick Adds   Type of Visit Initial Occupational Therapy Evaluation   Living Environment   People in Home alone   Current Living Arrangements apartment   Home Accessibility no concerns   Transportation Anticipated car, drives self   Living Environment Comments Lives alone with her cat. Walk in shower with shower chair, grab bar, curtain.   Self-Care   Usual Activity Tolerance moderate   Current Activity Tolerance fair   Regular Exercise No   Equipment Currently Used at Home cane, straight;shower chair   Fall history within last six months yes   Number of times patient has fallen within last six months 4  (per chart, pt did not mention # of falls to therapist)   Activity/Exercise/Self-Care Comment Pt reported being independent with ADLs at baseline, does her own grocery shopping. Reports using cane occasionally.   Instrumental Activities of Daily Living (IADL)   IADL Comments Typically has been doing her own IADLs including driving, errands.   General Information   Onset of Illness/Injury or Date of Surgery 07/20/22   Referring Physician Terrence Daley MD   Patient/Family Therapy Goal Statement (OT) To be able to manage day to day life   Additional Occupational Profile Info/Pertinent History of Current Problem 77 year old female with a history of Cognitive disorder, CAD, DM1 c/b retinopathy and neuropathy, MDD admitted on 7/17/2022. She presents for cough and high blood sugar found to have pneumonia and hyperglycemia from not taking her insulin. Given her cognitive disorder and concerns for ability to care for herself, it was recommended she be admitted for treatment of pneumonia and optimization of blood sugar   Existing Precautions/Restrictions fall;other (see comments)  (bed/chair alarms)   Limitations/Impairments safety/cognitive   Cognitive Status Examination   Orientation Status person;place   Affect/Mental Status (Cognitive) low arousal/lethargic   Safety Deficit insight into  deficits/self-awareness;problem-solving   Memory Deficit short-term memory;severe deficit   Attention Deficit concentration;focused/sustained attention;selective attention;alternating attention   Executive Function Deficit planning/decision-making;problem-solving/reasoning   Cognitive Status Comments patient participated in [facility] for cog screen with a score of 9 (score of 1-20 places her in same category as those with dementia).   Cognitive Screens/Assessments   Cognitive Assessments Completed [facility] Mental Status Exam (UMS):  Total Score out of /30 9   [facility] Norms 1-20 equals dementia   [facility] Domains assessed: orientation, memory, attention, executive functions   SLUMS Interpretation Patient oriented to person, place. Had difficulty with STM recall, calculation and registration, mild difficulty with category naming, delayed recall with interference, clock drawing, and story recall with executive functioning.   Visual Perception   Visual Impairment/Limitations corrective lenses for reading   Sensory   Sensory Comments Baseline numbness in B feet   Pain Assessment   Patient Currently in Pain No   Range of Motion Comprehensive   General Range of Motion bilateral upper extremity ROM WNL   Strength Comprehensive (MMT)   Comment, General Manual Muscle Testing (MMT) Assessment B UE's WFL   Bed Mobility   Comment (Bed Mobility) NA during eval   Transfers   Transfer Comments CGA-SBA using FWW   Bathing Assessment/Intervention   Strong Level (Bathing) minimum assist (75% patient effort)   Lower Body Dressing Assessment/Training   Strong Level (Lower Body Dressing) minimum assist (75% patient effort)   Grooming Assessment/Training   Strong Level (Grooming) contact guard assist   Toileting   Strong Level (Toileting) contact guard assist   Clinical Impression   Criteria for Skilled Therapeutic Interventions Met (OT) Yes, treatment indicated   OT Diagnosis Decreased functional  mobility and ADLs/IADLs   OT Problem List-Impairments impacting ADL problems related to;cognition;strength   Assessment of Occupational Performance 3-5 Performance Deficits   Identified Performance Deficits dressing,bathing, transfers, cognition, home mgmt   Planned Therapy Interventions (OT) ADL retraining;cognition   Clinical Decision Making Complexity (OT) low complexity   Anticipated Equipment Needs Upon Discharge (OT)   (TBD)   Risk & Benefits of therapy have been explained evaluation/treatment results reviewed;care plan/treatment goals reviewed;patient   OT Discharge Planning   OT Discharge Recommendation (DC Rec) Transitional Care Facility   OT Rationale for DC Rec Due to current cognitive impairment, patient is not safe to return home to independent living or driving. Recommend TCU at this time with possible transition to [facility] or memory care depending on cognitive status. Pt would benefit from neuro psych eval to further assess cognition.   OT Brief overview of current status CGA-SBA using FWW, scored a 9 on SLUMS   Total Evaluation Time (Minutes)   Total Evaluation Time (Minutes) 20   OT Goals   Therapy Frequency (OT) 5 times/wk   OT Predicted Duration/Target Date for Goal Attainment 07/28/22   OT Goals Hygiene/Grooming;Lower Body Dressing;Toilet Transfer/Toileting;Cognition;OT Goal 1   OT: Hygiene/Grooming modified independent;while standing   OT: Lower Body Dressing Modified independent;including set-up/clothing retrieval   OT: Toilet Transfer/Toileting Modified independent;toilet transfer;cleaning and garment management   OT: Cognitive Patient/caregiver will verbalize understanding of cognitive assessment results/recommendations as needed for safe discharge planning   OT: Goal 1 Pt will complete shower task with mod I

## 2022-07-21 NOTE — PROGRESS NOTES
/65 (BP Location: Left arm, Patient Position: Semi-Whelan's)   Pulse 97   Temp 98.2  F (36.8  C) (Oral)   Resp 20   Ht 1.524 m (5')   Wt 50.2 kg (110 lb 10.7 oz)   SpO2 95%   BMI 21.61 kg/m      Neuro: Pt is A&O x3 intermittently confused to situation and exact date with ccassional expressive aphasia. No complaints of pain, baseline numbness in BLE. Up w/ gait belt/walker. Arm bed/chair alarm.   Cardiac: NSR, normotensive  Resp: RA, persistent dry cough, received prn tessalon 2x.  GI: Tolerating regular diet, good appetite. Declined morning miralax. Last BM overnight.  :  Intermittently incontinent, voiding spontaneously  Integ: L thigh scab  IV Access: 22g R AC  Other: Reciving cefepime 2g q 12hr for pneumonia 7/18-7/23. Ambulated in hallway w/ PT. Up to recliner chair throughout the afternoon. Evaluated by OT; SLUMS score 9 indicative of dementia- see OT chart note for more details. Patient currently lives alone at home. Recommend social work consult for patient disposition planning.

## 2022-07-21 NOTE — PLAN OF CARE
Pt is alert and disoriented to situation and time overnight. Pt was intermittently confused and anxious and was unable to sleep through the night.  Bed alarm on for safety. Assist of 1 with gb and walker, ambulates to bathroom. VSS, x tachycardia, removed from tele this shift. LPIV leaking and removed, new IV placed RAC, SL. No pain reported. BGs 312, 175 this shift. Baseline numbness to hands and feet per pt report.  Persistent dry cough, on RA. Regular diet. LBM 7/20. Was continent of urine overnight. L hand bruising and R shin scab.  Pt states she is anxious and has difficulty sleeping as she has not had her lexapro since being hospitalized. Per physician note- continue lexapro. Sticky note left for physician.  Continue POC

## 2022-07-21 NOTE — PROGRESS NOTES
"[facility], [place]   Internal Medicine Daily Note           Interval History/Events     Overnight events reviewed  Confused overnight  This morning feels better  Alert, awake       Review of Systems        4 point ROS including Respiratory, CV, GI and , other than that noted above is negative      Medications   I have reviewed current medications  in the \"current medication\" section of Epic.  Relevant changes include:     Physical Exam   General:       Vital signs:    Blood pressure (!) 148/75, pulse 99, temperature 98.2  F (36.8  C), temperature source Oral, resp. rate 20, height 1.524 m (5'), weight 50.2 kg (110 lb 10.7 oz), SpO2 95 %.  Estimated body mass index is 21.61 kg/m  as calculated from the following:    Height as of this encounter: 1.524 m (5').    Weight as of this encounter: 50.2 kg (110 lb 10.7 oz).      Intake/Output Summary (Last 24 hours) at 7/19/2022 1509  Last data filed at 7/19/2022 0830  Gross per 24 hour   Intake 603 ml   Output --   Net 603 ml        Constitutional: Laying in bed in no acute distress  Eye: No icterus, no pallor  Mouth/ENT: No oral mucosa  Cardiovascular: S1, S2 normal.   Respiratory: B/L CTA  GI: Soft, NT, BS+  :   Neurology: Alert, awake, and oriented to place, and month.   Psych:   MSK:   Integumentary:   Heme/Lymph/Imm:      Laboratory and Imaging Studies     I have reviewed  laboratory and imaging studies in the Epic. Pertinent findings are as below:    BMP  Recent Labs   Lab 07/21/22  0904 07/21/22  0457 07/21/22  0150 07/20/22  2215 07/20/22  0742 07/20/22  0641 07/19/22  0807 07/19/22  0729 07/18/22  0307 07/18/22  0116   NA  --  141  --   --   --  143  --  136  --  132*   POTASSIUM  --  3.9  --   --   --  3.7  --  4.2  --  3.8   CHLORIDE  --  109  --   --   --  110*  --  104  --  97   THERON  --  9.1  --   --   --  8.9  --  8.5  --  8.7   CO2  --  28  --   --   --  28  --  23  --  26   BUN  --  16  --   --   --  19  --  29  --  37*   CR  " --  0.70  --   --   --  0.70  --  0.73  --  1.25*  1.32*   * 185* 175* 312*   < > 166*   < > 451*   < > 449*    < > = values in this interval not displayed.     CBC  Recent Labs   Lab 07/21/22  0457 07/20/22  0641 07/19/22  0729 07/18/22  0116   WBC 7.8 7.7 6.4 6.7   RBC 2.86* 2.89* 2.94* 2.68*   HGB 8.7* 8.8* 8.9* 8.4*   HCT 27.0* 26.9* 27.7* 24.2*   MCV 94 93 94 90   MCH 30.4 30.4 30.3 31.3   MCHC 32.2 32.7 32.1 34.7   RDW 13.3 13.2 13.1 12.9    268 261 230     INRNo lab results found in last 7 days.  LFTsNo lab results found in last 7 days.   PANCNo lab results found in last 7 days.        Impression/Plan      77 year old female with a history of Cognitive disorder, CAD, DM1 c/b retinopathy and neuropathy, MDD admitted on 7/17/2022. She presents for cough and high blood sugar found to have pneumonia and hyperglycemia from not taking her insulin. Given her cognitive disorder and concerns for ability to care for herself, it was recommended she be admitted for treatment of pneumonia and optimization of blood sugar with PT/OT evaluations.     # Hospital Acquired Pneumonia:   - COVID test here negative, at home RAT was positive >1 week ago (when daughter was still in town, but patient is unsure of exact date).   Required 1-2 liters intermittently on 07/19, on RA on 07/20  - Continue cefepime. Plan for 7 day treatment  - Add Duoneb prn     # DM1 complicated by neuropathy and retinopathy:   - patient not checking pre-meal blood glucose, regularly choosing not to dose glargine, but always give insulin with meals  - endocrine consulted. Appreciate recommendations. Discussed with DM NP on 07/20/2022     # MDD  - continue lexapro     # CAD s/p stent to LAD  PTA on Aspirin, and Metoprolol XL.   - Continue       # Cognitive impairment  # Probable delirium d/t infection, metabolic abnormality  - concerns for ability to care for herself given complexity of DM1 and forgetfulness  - PT /OT consulted  - Patient  little confused, will add seroquel low dose  - Delirium prevention measures  - Treat infection, hyperglycemia  Called daughter on 07/21 but her telephone line is breaks often. I was intermittently able to talk.       # Euvolemic Hyponatremia  - likely SIADH in the setting of pneumonia  Na level normal on 07/19/2022     # Moderate malnutrition in the context of acute on chronic illness  supplemental liquids provided - Ensure and Antonette Oodrive ordered to trial   Monitor labs, intakes, and weight trends     # Anemia of chronic disease  Hg 8.9 gm/dl on 07/19/2022  - monitor as needed        # Diet:   Regular diet  # DVT Prophylaxis: Enoxaparin (Lovenox) SQ  # Billy Catheter: Not present  # Central Lines: None  # Cardiac Monitoring: None  # Code Status:   DNR/DNI, confirmed with patient, per prior POLST through [facility]     Pt's care was discussed with bedside RN, patient and  during Care Team Rounds.               Terrence Daley MD  Hospitalist ( Internal medicine)  Pager: [phone]

## 2022-07-21 NOTE — PROGRESS NOTES
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      Assessment:   1)Type 1 Diabetes Mellitus, uncontrolled (A1c 10.0)  2) cognitive concerns    Plan:    -glargine 7 units daily at 1200   -Novolog 1:14g CHO coverage with meals and snacks/supplements   -Novolog medium intensity sliding scale TID AC and HS and 0200   -BG monitoring TID AC, HS, 0200   -hypoglycemia protocol   -carb counting protocol   -diabetes education needs will be assessed closer to discharge-- this has been an ongoing process outpatient, to manage in setting of pt's cognitive decline                -on discharge, will recommend outpatient follow up with [facility]  08/04/2022 Appointment Endocrinology Enzo Comer MD    [address] [place] [address]    [phone] (Work)    [phone] (Fax)                      -prescriptions needed on discharge:  Possibly new basal insulin, ensure glucometer supplies until pt's sensor arrives from [place].  (pt would need to ask family to check her deliveries or call [employer])        Plan discussed with patient and primary team through this note.        Interval History and Assessment: interval glucose trend reviewed:         Rossana is doing well today.  Eating well, good appetite.  Denies nausea, worked with OT this AM.    Per OT notes, recommend TCU at this time with possible transition to [facility] or memory care depending on cognitive status. Pt would benefit from neuro psych eval to further assess cognition.    Current nutritional intake and type:  Orders Placed This Encounter      Combination Diet Regular Diet Adult      Planned Procedures/surgeries: none  Steroid planning: none  D5W-containing solutions/medications: none    PTA Diabetes Regimen:   Pt reports last glargine 4 units in afternoon, maybe on 7/15/22  Humalog dosing unclear  **waiting on [employer] supplies    Discharge Planning: TBD, possible TCU           Diabetes History:   Type of Diabetes: Type 1 Diabetes Mellitus, with retinopathy, with neuropathy  Lab Results   Component Value Date    A1C 10.0 07/18/2022              Review of Systems:     The Review of Systems is negative other than noted in the Interval History.           Medications:     Current Facility-Administered Medications   Medication     benzonatate (TESSALON) capsule 100 mg     ceFEPIme (MAXIPIME) 2 g vial to attach to  ml bag for ADULTS or 50 ml bag for PEDS     dextrose 5% and 0.9% NaCl infusion     glucose gel 15-30 g    Or     dextrose 50 % injection 25-50 mL    Or     glucagon injection 1 mg     enoxaparin ANTICOAGULANT (LOVENOX) injection 40 mg     insulin aspart (NovoLOG) injection (RAPID ACTING)     insulin aspart (NovoLOG) injection (RAPID ACTING)     insulin aspart (NovoLOG) injection (RAPID ACTING)     insulin aspart (NovoLOG) injection (RAPID ACTING)     insulin glargine (LANTUS PEN) injection 7 Units     ipratropium - albuterol 0.5 mg/2.5 mg/3 mL (DUONEB) neb solution 3 mL     lidocaine (LMX4) cream     lidocaine 1 % 0.1-1 mL     melatonin tablet 1 mg     metoprolol succinate ER (TOPROL XL) 24 hr tablet 25 mg     ondansetron (ZOFRAN ODT) ODT tab 4 mg    Or     ondansetron (ZOFRAN) injection 4 mg     polyethylene glycol (MIRALAX) Packet 17 g     QUEtiapine (SEROquel) quarter-tab 6.25 mg     sodium chloride (PF) 0.9% PF flush 3 mL     sodium chloride (PF) 0.9% PF flush 3 mL            Physical Exam:    /74 (BP Location: Left arm)   Pulse 100   Temp 98.2  F (36.8  C) (Oral)   Resp 26   Ht 1.524 m  (5')   Wt 50.2 kg (110 lb 10.7 oz)   SpO2 96%   BMI 21.61 kg/m    General: pleasant, in no distress. Sitting in the chair.    HEENT: normocephalic, atraumatic. Oral mucous membranes moist.   Lungs: unlabored respiration, no cough  ABD: rounded, nondistended  Skin: warm and dry, no obvious lesions  MSK:  moves all extremities  Mental status:  alert, oriented to self, poor memory  Psych:  affect, calm and appropriate interaction             Data:     Recent Labs   Lab 07/21/22  0457 07/21/22  0150 07/20/22  2215 07/20/22  1901 07/20/22  1144 07/20/22  0742   * 175* 312* 256* 151* 176*     Lab Results   Component Value Date    WBC 7.8 07/21/2022    WBC 7.7 07/20/2022    WBC 6.4 07/19/2022    HGB 8.7 (L) 07/21/2022    HGB 8.8 (L) 07/20/2022    HGB 8.9 (L) 07/19/2022    HCT 27.0 (L) 07/21/2022    HCT 26.9 (L) 07/20/2022    HCT 27.7 (L) 07/19/2022    MCV 94 07/21/2022    MCV 93 07/20/2022    MCV 94 07/19/2022     07/21/2022     07/20/2022     07/19/2022     Lab Results   Component Value Date     07/21/2022     07/20/2022     07/19/2022    POTASSIUM 3.9 07/21/2022    POTASSIUM 3.7 07/20/2022    POTASSIUM 4.2 07/19/2022    CHLORIDE 109 07/21/2022    CHLORIDE 110 (H) 07/20/2022    CHLORIDE 104 07/19/2022    CO2 28 07/21/2022    CO2 28 07/20/2022    CO2 23 07/19/2022     (H) 07/21/2022     (H) 07/21/2022     (H) 07/20/2022     Lab Results   Component Value Date    BUN 16 07/21/2022    BUN 19 07/20/2022    BUN 29 07/19/2022     No results found for: TSH  No results found for: AST, ALT, GGT, ALKPHOS        25 minutes spent on the date of the encounter doing chart review, history and exam, documentation and further activities per the note      Over 50% of my time on the unit was spent counseling the patient and/or coordinating care regarding acute hyperglycemia management.  See note for details.    To contact Endocrine Diabetes service:   From 8AM-4PM: page  inpatient diabetes provider that is following the patient  For questions or updates from 4PM-8AM: page the diabetes job code for on call fellow: [phone]    FERNANDA Arias, CNP  Inpatient Diabetes Management Service  Pager [phone]

## 2022-07-22 NOTE — PLAN OF CARE
Shift: 07/22/22 [id number]  Neuro: A/O x3, disoriented to situation, following commands, able to reorient and redirect. PERRL. Afebrile. Denies pain.  CV: normotensive BP.  PU: on RA, LS diminished, infrequent nonproductive cough.   GI: BM x1. Regular diet, tolerating well, , 259, 193, covered with sliding scale and carb count. Lantus increased to 8units.   : voiding spontaneously, incontinent at times.   Skin: generalized bruising/scabbing.  IV: R PIV.   Other info: Assist x1, GB/walker. Cognitive assessment completed yesterday - see results. Plan to discharge to TCU, discharge pending clinical course, will continue to monitor.     Goal Outcome Evaluation:    Plan of Care Reviewed With: patient

## 2022-07-22 NOTE — PROGRESS NOTES
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      Assessment:   1)Type 1 Diabetes Mellitus, uncontrolled (A1c 10.0)  2) cognitive concerns    Plan:    -increase glargine to 8 units from 7 units daily at 1200   -increase Novolog to 1:13g CHO from 1:14g CHO coverage with meals and snacks/supplements   -Novolog medium intensity sliding scale TID AC and HS and 0200   -BG monitoring TID AC, HS, 0200   -hypoglycemia protocol   -carb counting protocol   -diabetes education needs will be assessed closer to discharge-- this has been an ongoing process outpatient, to manage in setting of pt's cognitive decline                -on discharge, will recommend outpatient follow up with [facility]  08/04/2022 Appointment Endocrinology Enzo Comer MD    [address] [place] [address]    [phone] (Work)    [phone] (Fax)                      -prescriptions needed on discharge:  Possibly new basal insulin, ensure glucometer supplies until pt's sensor arrives from [employer].  (pt would need to ask family to check her deliveries or call [facility])        Plan discussed with patient and primary team through this note.        Interval History and Assessment: interval glucose trend reviewed:         BG to mid 200s.  Betsty ok with increase in Lantus (she does not want any large increases), will also increase prandial insulin.  Patient is really wanting to go home, but TCU recommended.  Denies nausea, vomiting, abdominal pain.     Per OT notes, recommend TCU at this time  with possible transition to [facility] or memory care depending on cognitive status. Pt would benefit from neuro psych eval to further assess cognition.    Labs: hgb 8.2. GFR 89.     Current nutritional intake and type: Orders Placed This Encounter      Combination Diet Regular Diet Adult      Planned Procedures/surgeries: none  Steroid planning: none  D5W-containing solutions/medications: none    PTA Diabetes Regimen:   Pt reports last glargine 4 units in afternoon, maybe on 7/15/22  Humalog dosing unclear  **waiting on [employer] supplies    Discharge Planning: TBD, possible TCU           Diabetes History:   Type of Diabetes: Type 1 Diabetes Mellitus, with retinopathy, with neuropathy  Lab Results   Component Value Date    A1C 10.0 07/18/2022              Review of Systems:     The Review of Systems is negative other than noted in the Interval History.           Medications:     Current Facility-Administered Medications   Medication     acetaminophen (TYLENOL) tablet 325 mg     aspirin (ASA) chewable tablet 81 mg     benzonatate (TESSALON) capsule 100 mg     ceFEPIme (MAXIPIME) 2 g vial to attach to  ml bag for ADULTS or 50 ml bag for PEDS     dextrose 5% and 0.9% NaCl infusion     glucose gel 15-30 g    Or     dextrose 50 % injection 25-50 mL    Or     glucagon injection 1 mg     enoxaparin ANTICOAGULANT (LOVENOX) injection 40 mg     escitalopram (LEXAPRO) tablet 10 mg     insulin aspart (NovoLOG) injection (RAPID ACTING)     insulin aspart (NovoLOG) injection (RAPID ACTING)     insulin aspart (NovoLOG) injection (RAPID ACTING)     insulin aspart (NovoLOG) injection (RAPID ACTING)     insulin glargine (LANTUS PEN) injection 8 Units     ipratropium - albuterol 0.5 mg/2.5 mg/3 mL (DUONEB) neb solution 3 mL     lidocaine (LMX4) cream     lidocaine 1 % 0.1-1 mL     melatonin tablet 1 mg     metoprolol succinate ER (TOPROL XL) 24 hr tablet 25 mg     ondansetron (ZOFRAN ODT) ODT tab 4 mg    Or     ondansetron  (ZOFRAN) injection 4 mg     polyethylene glycol (MIRALAX) Packet 17 g     QUEtiapine (SEROquel) quarter-tab 6.25 mg     sodium chloride (PF) 0.9% PF flush 3 mL     sodium chloride (PF) 0.9% PF flush 3 mL            Physical Exam:    /66 (BP Location: Left arm)   Pulse 89   Temp 98.4  F (36.9  C) (Oral)   Resp 18   Ht 1.524 m (5')   Wt 50.2 kg (110 lb 10.7 oz)   SpO2 94%   BMI 21.61 kg/m    General: pleasant, in no distress. Lying in bed.   HEENT: normocephalic, atraumatic. Oral mucous membranes moist.   Lungs: unlabored respiration, no cough  ABD: rounded, nondistended  Skin: warm and dry, no obvious lesions  MSK:  moves all extremities  Mental status:  alert, oriented to self, poor memory  Psych:  affect, calm and appropriate interaction             Data:     Recent Labs   Lab 07/22/22  0437 07/22/22  0153 07/21/22  2141 07/21/22  1704 07/21/22  1432 07/21/22  0904   * 249* 248* 301* 216* 177*     Lab Results   Component Value Date    WBC 7.3 07/22/2022    WBC 7.8 07/21/2022    WBC 7.7 07/20/2022    HGB 8.2 (L) 07/22/2022    HGB 8.7 (L) 07/21/2022    HGB 8.8 (L) 07/20/2022    HCT 25.1 (L) 07/22/2022    HCT 27.0 (L) 07/21/2022    HCT 26.9 (L) 07/20/2022    MCV 93 07/22/2022    MCV 94 07/21/2022    MCV 93 07/20/2022     07/22/2022     07/21/2022     07/20/2022     Lab Results   Component Value Date     07/22/2022     07/21/2022     07/20/2022    POTASSIUM 4.0 07/22/2022    POTASSIUM 3.9 07/21/2022    POTASSIUM 3.7 07/20/2022    CHLORIDE 107 07/22/2022    CHLORIDE 109 07/21/2022    CHLORIDE 110 (H) 07/20/2022    CO2 25 07/22/2022    CO2 28 07/21/2022    CO2 28 07/20/2022     (H) 07/22/2022     (H) 07/22/2022     (H) 07/21/2022     Lab Results   Component Value Date    BUN 18 07/22/2022    BUN 16 07/21/2022    BUN 19 07/20/2022     No results found for: TSH  No results found for: AST, ALT, GGT, ALKPHOS        35 minutes spent on the date of  the encounter doing chart review, history and exam, documentation and further activities per the note      Over 50% of my time on the unit was spent counseling the patient and/or coordinating care regarding acute hyperglycemia management.  See note for details.    To contact Endocrine Diabetes service:   From 8AM-4PM: page inpatient diabetes provider that is following the patient  For questions or updates from 4PM-8AM: page the diabetes job code for on call fellow: [phone]    FERNANDA Arias, CNP  Inpatient Diabetes Management Service  Pager [phone]

## 2022-07-22 NOTE — PROGRESS NOTES
"[facility], [place]   Internal Medicine Daily Note           Interval History/Events     Overnight events reviewed  Reports doing well  No  Nausea, vomiting, chest pain, shortness of breath  No fever, chills.        Review of Systems        4 point ROS including Respiratory, CV, GI and , other than that noted above is negative      Medications   I have reviewed current medications  in the \"current medication\" section of Epic.  Relevant changes include:     Physical Exam   General:       Vital signs:    Blood pressure 127/74, pulse 93, temperature 98.1  F (36.7  C), temperature source Oral, resp. rate 18, height 1.524 m (5'), weight 50.2 kg (110 lb 10.7 oz), SpO2 94 %.  Estimated body mass index is 21.61 kg/m  as calculated from the following:    Height as of this encounter: 1.524 m (5').    Weight as of this encounter: 50.2 kg (110 lb 10.7 oz).      Intake/Output Summary (Last 24 hours) at 7/19/2022 1509  Last data filed at 7/19/2022 0830  Gross per 24 hour   Intake 603 ml   Output --   Net 603 ml        Constitutional: Laying in bed in no acute distress  Eye: No icterus, no pallor  Mouth/ENT: No oral mucosa  Cardiovascular: S1, S2 normal.   Respiratory: B/L CTA  GI: Soft, NT, BS+  :   Neurology: Alert, awake, and oriented to place, and month.   Psych:   MSK:   Integumentary:   Heme/Lymph/Imm:      Laboratory and Imaging Studies     I have reviewed  laboratory and imaging studies in the Epic. Pertinent findings are as below:    BMP  Recent Labs   Lab 07/22/22  1335 07/22/22  1233 07/22/22  0846 07/22/22  0437 07/21/22  0904 07/21/22  0457 07/20/22  0742 07/20/22  0641 07/19/22  0807 07/19/22  0729   NA  --   --   --  139  --  141  --  143  --  136   POTASSIUM  --   --   --  4.0  --  3.9  --  3.7  --  4.2   CHLORIDE  --   --   --  107  --  109  --  110*  --  104   THERON  --   --   --  8.7  --  9.1  --  8.9  --  8.5   CO2  --   --   --  25  --  28  --  28  --  23   BUN  --   --   --  18  " --  16  --  19  --  29   CR  --   --   --  0.69  --  0.70  --  0.70  --  0.73   * 259* 271* 181*   < > 185*   < > 166*   < > 451*    < > = values in this interval not displayed.     CBC  Recent Labs   Lab 07/22/22  0437 07/21/22  0457 07/20/22  0641 07/19/22  0729   WBC 7.3 7.8 7.7 6.4   RBC 2.70* 2.86* 2.89* 2.94*   HGB 8.2* 8.7* 8.8* 8.9*   HCT 25.1* 27.0* 26.9* 27.7*   MCV 93 94 93 94   MCH 30.4 30.4 30.4 30.3   MCHC 32.7 32.2 32.7 32.1   RDW 13.5 13.3 13.2 13.1    277 268 261     INRNo lab results found in last 7 days.  LFTsNo lab results found in last 7 days.   PANCNo lab results found in last 7 days.        Impression/Plan      77 year old female with a history of Cognitive disorder, CAD, DM1 c/b retinopathy and neuropathy, MDD admitted on 7/17/2022. She presents for cough and high blood sugar found to have pneumonia and hyperglycemia from not taking her insulin. Given her cognitive disorder and concerns for ability to care for herself, it was recommended she be admitted for treatment of pneumonia and optimization of blood sugar with PT/OT evaluations.     # Hospital Acquired Pneumonia:   - COVID test here negative, at home RAT was positive >1 week ago (when daughter was still in town, but patient is unsure of exact date).   Required 1-2 liters intermittently on 07/19, on RA on 07/20  On Room air   - Continue cefepime. Plan for 7 day treatment  - Add Duoneb prn     # DM1 complicated by neuropathy and retinopathy:   - Patient not checking pre-meal blood glucose, regularly choosing not to dose glargine, but always give insulin with meals  - Endocrine consulted. Appreciate recommendations. Discussed with DM NP on 07/20/2022     # MDD  - continue lexapro     # CAD s/p stent to LAD  PTA on Aspirin, and Metoprolol XL.   - Continue       # Cognitive impairment  # Probable delirium d/t infection, metabolic abnormality  - concerns for ability to care for herself given complexity of DM1 and  forgetfulness  - PT /OT consulted  - Patient little confused, will add seroquel low dose  - Delirium prevention measures  - Treat infection, hyperglycemia  Called daughter on 07/21 but her telephone line is breaks often. I was intermittently able to talk.       # Euvolemic Hyponatremia  - likely SIADH in the setting of pneumonia  Na level normal on 07/19/2022     # Malnutrition  - nutrition consult     # Anemia of chronic disease  Hg 8.9 gm/dl on 07/19/2022  - monitor as needed     # Diet:   Regular diet  # DVT Prophylaxis: Enoxaparin (Lovenox) SQ  # Billy Catheter: Not present  # Central Lines: None  # Cardiac Monitoring: None  # Code Status:   DNR/DNI, confirmed with patient, per prior POLST through health [facility]     Pt's care was discussed with bedside RN, patient and  during Care Team Rounds.               Terrence Daley MD  Hospitalist ( Internal medicine)  Pager: [phone]

## 2022-07-22 NOTE — PLAN OF CARE
Pt is alert and disoriented to situation and occasionally time. Able to use call light appropriately. Slept well overnight. Assist of 1 with gb and walker, using toilet but can be incontinent. LBM 7/20. RAC PIV SL in between abx. Abx should be done on 7/23. VSS overnight. Denies pain, N/T. Bg 249 at 0200. No new concerns overnight. Continue POC.

## 2022-07-22 NOTE — PLAN OF CARE
Patient is alert and oriented x3, has intermittent confusion. Baseline N/T per report. However, pt denies this shift. Denies pain. Up with assist of 1, walker and gait belt. Pt ambulated in room and to bathroom. Denies CP, On RA, denies SOB, persistent dry cough, has prn tessalon available. Regular diet, thins. BG monitored on sliding scale and carb cover. BG were 301 and 248 consecutively.  LBM 7/21/22, BS+. Voiding without difficulties. Can be incontinent occasionally. RAC PIV SL. On IV Abx until 7/23.  Alarm on for safety. Call light with in reach. Frequnt safety rounds done. Continue with POC.

## 2022-07-23 NOTE — PLAN OF CARE
VS: /57   Pulse 89   Temp 98.6  F (37  C) (Oral)   Resp 16   Ht 1.524 m (5')   Wt 50.2 kg (110 lb 10.7 oz)   SpO2 97%   BMI 21.61 kg/m     O2: 97% on RA, denies SOB or respiratory distress   Output: Voiding adequate amounts with use of toilet    Last BM: 7/23   Activity: Needs assist of one with walker and gait belt    Up for meals? no   Skin: No skin issues noted    Pain: Denies pain or discomfort    CMS: Alert, disoriented to situation, forgetful    Dressing: None    Diet: Regular, blood glucose 313, insulin given per sliding scale    LDA: Right arm PIV, saline locked   Equipment: Walker and personal belongings   Plan: Will continue plan of care    Additional Info:

## 2022-07-23 NOTE — PROGRESS NOTES
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    Assessment:   1)Type 1 Diabetes Mellitus, uncontrolled (A1c 10.0)  2) cognitive concerns    Plan:    -Lantus 8 units daily at 1200   -decrease Novolog to 1:15g CHO coverage with meals and snacks/supplements   -Novolog medium intensity sliding scale TID AC and HS and 0200   -BG monitoring TID AC, HS, 0200   -hypoglycemia protocol   -carb counting protocol   -diabetes education needs will be assessed closer to discharge-- this has been an ongoing process outpatient, to manage in setting of pt's cognitive decline                -on discharge, will recommend outpatient follow up with [facility]  08/04/2022 Appointment Endocrinology Enzo Comer MD    [address] [place] [address]    [phone] (Work)    [phone] (Fax)                      -prescriptions needed on discharge:  Possibly new basal insulin, ensure glucometer supplies until pt's sensor arrives from [employer].  (pt would need to ask family to check her deliveries or call [employer])        Interval History and Assessment:   Chart review only today.   Hypoglycemia yesterday evening (59), postprandial. Poor PO intake yesterday is noted.    this morning and 189 before lunch today.     Current nutritional intake and type: Orders Placed This Encounter      Combination Diet Regular Diet Adult    Planned Procedures/surgeries: none  Steroid planning: none  D5W-containing solutions/medications: none    PTA Diabetes Regimen:   Pt reports last glargine  4 units in afternoon, maybe on 7/15/22  Humalog dosing unclear  **waiting on [employer] supplies    Discharge Planning: TBD, possible TCU           Diabetes History:   Type of Diabetes: Type 1 Diabetes Mellitus, with retinopathy, with neuropathy  Lab Results   Component Value Date    A1C 10.0 07/18/2022              Review of Systems:     NA         Medications:     Current Facility-Administered Medications   Medication     acetaminophen (TYLENOL) tablet 325 mg     aspirin (ASA) chewable tablet 81 mg     benzonatate (TESSALON) capsule 100 mg     dextrose 5% and 0.9% NaCl infusion     glucose gel 15-30 g    Or     dextrose 50 % injection 25-50 mL    Or     glucagon injection 1 mg     enoxaparin ANTICOAGULANT (LOVENOX) injection 40 mg     escitalopram (LEXAPRO) tablet 10 mg     insulin aspart (NovoLOG) injection (RAPID ACTING)     insulin aspart (NovoLOG) injection (RAPID ACTING)     insulin aspart (NovoLOG) injection (RAPID ACTING)     insulin aspart (NovoLOG) injection (RAPID ACTING)     insulin glargine (LANTUS PEN) injection 8 Units     ipratropium - albuterol 0.5 mg/2.5 mg/3 mL (DUONEB) neb solution 3 mL     lidocaine (LMX4) cream     lidocaine 1 % 0.1-1 mL     melatonin tablet 1 mg     metoprolol succinate ER (TOPROL XL) 24 hr tablet 25 mg     ondansetron (ZOFRAN ODT) ODT tab 4 mg    Or     ondansetron (ZOFRAN) injection 4 mg     polyethylene glycol (MIRALAX) Packet 17 g     QUEtiapine (SEROquel) quarter-tab 6.25 mg     sodium chloride (PF) 0.9% PF flush 3 mL     sodium chloride (PF) 0.9% PF flush 3 mL            Physical Exam:    BP (!) 141/78 (BP Location: Left arm, Patient Position: Semi-Whelan's)   Pulse 89   Temp 98.2  F (36.8  C) (Oral)   Resp 16   Ht 1.524 m (5')   Wt 50.2 kg (110 lb 10.7 oz)   SpO2 97%   BMI 21.61 kg/m              Data:     Recent Labs   Lab 07/23/22  1226 07/23/22  0900 07/23/22  0522 07/23/22  0200 07/22/22  2249 07/22/22  2212   * 158* 150* 147* 211* 59*     Lab  Results   Component Value Date    WBC 7.4 07/23/2022    WBC 7.3 07/22/2022    WBC 7.8 07/21/2022    HGB 8.7 (L) 07/23/2022    HGB 8.2 (L) 07/22/2022    HGB 8.7 (L) 07/21/2022    HCT 26.4 (L) 07/23/2022    HCT 25.1 (L) 07/22/2022    HCT 27.0 (L) 07/21/2022    MCV 94 07/23/2022    MCV 93 07/22/2022    MCV 94 07/21/2022     07/23/2022     07/22/2022     07/21/2022     Lab Results   Component Value Date     07/23/2022     07/22/2022     07/21/2022    POTASSIUM 4.1 07/23/2022    POTASSIUM 4.0 07/22/2022    POTASSIUM 3.9 07/21/2022    CHLORIDE 107 07/23/2022    CHLORIDE 107 07/22/2022    CHLORIDE 109 07/21/2022    CO2 29 07/23/2022    CO2 25 07/22/2022    CO2 28 07/21/2022     (H) 07/23/2022     (H) 07/23/2022     (H) 07/23/2022     Lab Results   Component Value Date    BUN 20 07/23/2022    BUN 18 07/22/2022    BUN 16 07/21/2022     No results found for: TSH  No results found for: AST, ALT, GGT, ALKPHOS        To contact Endocrine Diabetes service:   From 8AM-4PM: page inpatient diabetes provider that is following the patient  For questions or updates from 4PM-8AM: page the diabetes job code for on call fellow: [phone]      No charge, chart review only on 7/23/2022.       Lilia Estrada MD  Endocrinology and Diabetes   [phone]

## 2022-07-23 NOTE — PROGRESS NOTES
[id number]  A&Ox3 - disoriented to situation, forgetful. VSS. BG's this shift 158 and 189. Denied chest pain. Denied SOB. No c/o pain. Up to bathroom voiding spontaneously w/o difficulty. Ax1 via walker and gait belt. R PIV SL. Regular diet - carb count.   Pt able to make her needs known - can be forgetful. Bed alarm on for safety. Call light within reach. Placement TBD - PT to reassess per MD.

## 2022-07-23 NOTE — PROGRESS NOTES
"[facility], [place]   Internal Medicine Daily Note           Interval History/Events     Overnight events reviewed  Reports doing well  No  Nausea, vomiting, chest pain, shortness of breath  No fever, chills.        Review of Systems        4 point ROS including Respiratory, CV, GI and , other than that noted above is negative      Medications   I have reviewed current medications  in the \"current medication\" section of Epic.  Relevant changes include:     Physical Exam   General:       Vital signs:    Blood pressure 101/47, pulse 81, temperature 98.2  F (36.8  C), temperature source Oral, resp. rate 16, height 1.524 m (5'), weight 50.2 kg (110 lb 10.7 oz), SpO2 96 %.  Estimated body mass index is 21.61 kg/m  as calculated from the following:    Height as of this encounter: 1.524 m (5').    Weight as of this encounter: 50.2 kg (110 lb 10.7 oz).      Intake/Output Summary (Last 24 hours) at 7/19/2022 1509  Last data filed at 7/19/2022 0830  Gross per 24 hour   Intake 603 ml   Output --   Net 603 ml        Constitutional: Laying in bed in no acute distress  Eye: No icterus, no pallor  Mouth/ENT: No oral mucosa  Cardiovascular: S1, S2 normal.   Respiratory: B/L CTA  GI: Soft, NT, BS+  :   Neurology: Alert, awake, and not oriented to day, date, and  month. No focal neuro derficit  Psych:   MSK:   Integumentary:   Heme/Lymph/Imm:      Laboratory and Imaging Studies     I have reviewed  laboratory and imaging studies in the Epic. Pertinent findings are as below:    BMP  Recent Labs   Lab 07/23/22  1226 07/23/22  0900 07/23/22  0522 07/23/22  0200 07/22/22  0846 07/22/22  0437 07/21/22  0904 07/21/22  0457 07/20/22  0742 07/20/22  0641   NA  --   --  141  --   --  139  --  141  --  143   POTASSIUM  --   --  4.1  --   --  4.0  --  3.9  --  3.7   CHLORIDE  --   --  107  --   --  107  --  109  --  110*   THERON  --   --  8.7  --   --  8.7  --  9.1  --  8.9   CO2  --   --  29  --   --  25  --  28  " --  28   BUN  --   --  20  --   --  18  --  16  --  19   CR  --   --  0.78  --   --  0.69  --  0.70  --  0.70   * 158* 150* 147*   < > 181*   < > 185*   < > 166*    < > = values in this interval not displayed.     CBC  Recent Labs   Lab 07/23/22  0522 07/22/22  0437 07/21/22  0457 07/20/22  0641   WBC 7.4 7.3 7.8 7.7   RBC 2.82* 2.70* 2.86* 2.89*   HGB 8.7* 8.2* 8.7* 8.8*   HCT 26.4* 25.1* 27.0* 26.9*   MCV 94 93 94 93   MCH 30.9 30.4 30.4 30.4   MCHC 33.0 32.7 32.2 32.7   RDW 13.8 13.5 13.3 13.2    265 277 268     INRNo lab results found in last 7 days.  LFTsNo lab results found in last 7 days.   PANCNo lab results found in last 7 days.        Impression/Plan      77 year old female with a history of Cognitive disorder, CAD, DM1 c/b retinopathy and neuropathy, MDD admitted on 7/17/2022. She presents for cough and high blood sugar found to have pneumonia and hyperglycemia from not taking her insulin. Given her cognitive disorder and concerns for ability to care for herself, it was recommended she be admitted for treatment of pneumonia and optimization of blood sugar with PT/OT evaluations.     # Hospital Acquired Pneumonia:   - COVID test here negative, at home RAT was positive >1 week ago (when daughter was still in town, but patient is unsure of exact date).   Required 1-2 liters intermittently on 07/19, on RA on 07/20  On Room air   - Continue cefepime. Plan for 7 day treatment  - Add Duoneb prn     # DM1 complicated by neuropathy and retinopathy:   - Patient not checking pre-meal blood glucose, regularly choosing not to dose glargine, but always give insulin with meals  - Endocrine consulted. Appreciate recommendations. Discussed with DM NP on 07/20/2022     # MDD  - continue lexapro     # CAD s/p stent to LAD  PTA on Aspirin, and Metoprolol XL.   - Continue       # Cognitive impairment  # Probable delirium d/t infection, metabolic abnormality  - concerns for ability to care for herself given  complexity of DM1 and forgetfulness  - PT /OT consulted  - Patient little confused, will add seroquel low dose  - Delirium prevention measures  - Treat infection, hyperglycemia  - PT/OT revalaution       # Euvolemic Hyponatremia  - likely SIADH in the setting of pneumonia  Na level normal on 07/19/2022     # Moderate malnutrition in the context of acute on chronic illness  supplemental liquids provided - Ensure and [employer] ordered to trial   Monitor labs, intakes, and weight trends     # Anemia of chronic disease  Hg 8.9 gm/dl on 07/19/2022  - monitor as needed     # Diet:   Regular diet  # DVT Prophylaxis: Enoxaparin (Lovenox) SQ  # Billy Catheter: Not present  # Central Lines: None  # Cardiac Monitoring: None  # Code Status:   DNR/DNI, confirmed with patient, per prior POLST through health [employer]    Disposition: PT/OT currently recommending TCU. Patient still has ongoing confusion. May require day or two     Pt's care was discussed with bedside RN, patient and  during Care Team Rounds.               Terrence Daley MD  Hospitalist ( Internal medicine)  Pager: [phone]

## 2022-07-23 NOTE — PROGRESS NOTES
Shift: 07/22/22 5420-4614  Neuro: A/O x3, disoriented to situation, forgetful, following commands, able to reorient and redirect. PERRL. Afebrile. Denies pain.  CV: normotensive BP.  PU: On room air, LS diminished, infrequent nonproductive cough.   GI: LBM 7/22. Regular diet- poor intake this shift. Blood glucose dropped to 59 at 2200. Refused to take apple juice or oral glucagon gel. IV D50 25 ml given with BG improved to 211.  : voiding spontaneously, incontinent at times.   Skin: generalized bruising/scabbing.  IV: Right PIV sl  Other info: Assist x1, GB/walker. Plan to discharge to TCU,     Goal Outcome Evaluation:     Plan of Care Reviewed With: patient

## 2022-07-24 NOTE — PLAN OF CARE
Patient A&O with some confusion noted. Denied CP,lightheadedness, dizziness, numbness or tingling. Denied SOB/APONTE. Pt is drinking well and voiding spontaneously without difficulties. CMS intact and denied pain. BG stable and covered with insulin as ordered. Incentive spirometer encouraged and done several times, repositioned and turned in bed, heels elevated off bed, demonstrates the ability to use call light appropriately. Will continue with POC.

## 2022-07-24 NOTE — PROGRESS NOTES
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    Assessment:   1)Type 1 Diabetes Mellitus, uncontrolled (A1c 10.0)  2) cognitive concerns    Plan:    -Lantus 8 units daily at 1200   -Novolog 1:15g CHO coverage with meals and snacks/supplements   -Novolog medium intensity sliding scale TID AC and HS and 0200   -BG monitoring TID AC, HS, 0200   -hypoglycemia protocol   -carb counting protocol   -diabetes education needs will be assessed closer to discharge-- this has been an ongoing process outpatient, to manage in setting of pt's cognitive decline                -on discharge, will recommend outpatient follow up with [facility]  08/04/2022 Appointment Endocrinology Enzo Comer MD    [address] [place] [address]    [phone] (Work)    [phone] (Fax)                      -prescriptions needed on discharge:  Possibly new basal insulin, ensure glucometer supplies until pt's sensor arrives from [employer].  (pt would need to ask family to check her deliveries or call [place])      Lilia Estrada MD  Endocrinology and Diabetes   [phone]        Interval History and Assessment:     High blood sugar of 313 yesterday at supper. Corrected overnight and AM blood sugar is 179 today.     She tells me today that appetite is inconsistent. She is not sure what she had to eat yesterday. I do note a 26 g CHO meal with 1 unit of Novolog (approriate with 1:15g dosing but still a low dose for this level of carbohydrate), this may have contributed.       Recent Labs    Lab 07/24/22  0839 07/24/22  0600 07/23/22  2140 07/23/22  1726 07/23/22  1226 07/23/22  0900   * 152* 244* 313* 189* 158*       Current nutritional intake and type: Orders Placed This Encounter      Combination Diet Regular Diet Adult    Planned Procedures/surgeries: none  Steroid planning: none  D5W-containing solutions/medications: none    PTA Diabetes Regimen:   Pt reports last glargine 4 units in afternoon, maybe on 7/15/22  Humalog dosing unclear  **waiting on [employer] supplies    Discharge Planning: TBD, possible TCU           Diabetes History:   Type of Diabetes: Type 1 Diabetes Mellitus, with retinopathy, with neuropathy  Lab Results   Component Value Date    A1C 10.0 07/18/2022              Review of Systems:     Pertinent systems were negative except as noted in history.          Medications:     Current Facility-Administered Medications   Medication     acetaminophen (TYLENOL) tablet 325 mg     aspirin (ASA) chewable tablet 81 mg     benzonatate (TESSALON) capsule 100 mg     dextrose 5% and 0.9% NaCl infusion     glucose gel 15-30 g    Or     dextrose 50 % injection 25-50 mL    Or     glucagon injection 1 mg     enoxaparin ANTICOAGULANT (LOVENOX) injection 40 mg     escitalopram (LEXAPRO) tablet 10 mg     insulin aspart (NovoLOG) injection (RAPID ACTING)     insulin aspart (NovoLOG) injection (RAPID ACTING)     insulin aspart (NovoLOG) injection (RAPID ACTING)     insulin aspart (NovoLOG) injection (RAPID ACTING)     insulin glargine (LANTUS PEN) injection 8 Units     ipratropium - albuterol 0.5 mg/2.5 mg/3 mL (DUONEB) neb solution 3 mL     lidocaine (LMX4) cream     lidocaine 1 % 0.1-1 mL     melatonin tablet 1 mg     metoprolol succinate ER (TOPROL XL) 24 hr tablet 25 mg     ondansetron (ZOFRAN ODT) ODT tab 4 mg    Or     ondansetron (ZOFRAN) injection 4 mg     polyethylene glycol (MIRALAX) Packet 17 g     QUEtiapine (SEROquel) quarter-tab 6.25 mg     sodium chloride (PF) 0.9% PF flush  3 mL     sodium chloride (PF) 0.9% PF flush 3 mL            Physical Exam:    /72 (BP Location: Left arm, Patient Position: Semi-Whelan's, Cuff Size: Adult Regular)   Pulse 87   Temp 97.8  F (36.6  C) (Oral)   Resp 18   Ht 1.524 m (5')   Wt 50.2 kg (110 lb 10.7 oz)   SpO2 94%   BMI 21.61 kg/m      Constitutional: alert and no distress   Eyes: sclerae clear  Respiratory: breathing and speaking comfortably  Neuro: CN grossly intact, alert, normal speech             Data:     Recent Labs   Lab 07/24/22  0839 07/24/22  0600 07/23/22  2140 07/23/22  1726 07/23/22  1226 07/23/22  0900   * 152* 244* 313* 189* 158*     Lab Results   Component Value Date    WBC 7.3 07/24/2022    WBC 7.4 07/23/2022    WBC 7.3 07/22/2022    HGB 8.7 (L) 07/24/2022    HGB 8.7 (L) 07/23/2022    HGB 8.2 (L) 07/22/2022    HCT 27.1 (L) 07/24/2022    HCT 26.4 (L) 07/23/2022    HCT 25.1 (L) 07/22/2022    MCV 95 07/24/2022    MCV 94 07/23/2022    MCV 93 07/22/2022     07/24/2022     07/23/2022     07/22/2022     Lab Results   Component Value Date     07/24/2022     07/23/2022     07/22/2022    POTASSIUM 4.0 07/24/2022    POTASSIUM 4.1 07/23/2022    POTASSIUM 4.0 07/22/2022    CHLORIDE 108 07/24/2022    CHLORIDE 107 07/23/2022    CHLORIDE 107 07/22/2022    CO2 28 07/24/2022    CO2 29 07/23/2022    CO2 25 07/22/2022     (H) 07/24/2022     (H) 07/24/2022     (H) 07/23/2022     Lab Results   Component Value Date    BUN 25 07/24/2022    BUN 20 07/23/2022    BUN 18 07/22/2022     No results found for: TSH  No results found for: AST, ALT, GGT, ALKPHOS        To contact Endocrine Diabetes service:   From 8AM-4PM: page inpatient diabetes provider that is following the patient  For questions or updates from 4PM-8AM: page the diabetes job code for on call fellow: [phone]

## 2022-07-24 NOTE — PLAN OF CARE
[id number]    /60 (BP Location: Left arm)   Pulse 87   Temp 98.4  F (36.9  C) (Oral)   Resp 17   Ht 1.524 m (5')   Wt 50.2 kg (110 lb 10.7 oz)   SpO2 94%   BMI 21.61 kg/m      Patient is alert and oriented to self. disoriented to situation, date and time. Alarm on, call light within reach. orientation and frequent safety check done.  Denies numbness and tingling. Denies pain. On RA stable, denies SOB, denies distress, intermittent dry cough. IS encouraged. Denies CP. On regular diet, thins. Pt needs assistant to order meals.  Ate 100% for dinner. BG before dinner was 127. On sliding scale and carb cover. Up with assist of 1, walker and gait belt. Can be incontinent of bladder. Voided in toilet this shift. LBM this shift. Skin- fading bruising on bilateral arms. PIV to R AC patent and SL. No care concern at this time. Awaiting TCU placement. Continue with POC.

## 2022-07-24 NOTE — PROGRESS NOTES
"[facility], [place]   Internal Medicine Daily Note           Interval History/Events     Overnight events reviewed  Reports doing well   No nausea, vomiting, chest pain, shortness of breath  No fever, chills.        Review of Systems        4 point ROS including Respiratory, CV, GI and , other than that noted above is negative      Medications   I have reviewed current medications  in the \"current medication\" section of Epic.  Relevant changes include:     Physical Exam   General:       Vital signs:    Blood pressure 137/72, pulse 87, temperature 97.8  F (36.6  C), temperature source Oral, resp. rate 18, height 1.524 m (5'), weight 50.2 kg (110 lb 10.7 oz), SpO2 94 %.  Estimated body mass index is 21.61 kg/m  as calculated from the following:    Height as of this encounter: 1.524 m (5').    Weight as of this encounter: 50.2 kg (110 lb 10.7 oz).      Intake/Output Summary (Last 24 hours) at 7/19/2022 1509  Last data filed at 7/19/2022 0830  Gross per 24 hour   Intake 603 ml   Output --   Net 603 ml        Constitutional: Laying in bed in no acute distress  Eye: No icterus, no pallor  Mouth/ENT: No oral mucosa  Cardiovascular: S1, S2 normal.   Respiratory: B/L CTA  GI: Soft, NT, BS+  :   Neurology: Alert, awake, and not oriented to day, date, and  month. No focal neuro derficit  Psych:   MSK:   Integumentary:   Heme/Lymph/Imm:      Laboratory and Imaging Studies     I have reviewed  laboratory and imaging studies in the Epic. Pertinent findings are as below:    BMP  Recent Labs   Lab 07/24/22  1144 07/24/22  0839 07/24/22  0600 07/23/22  2140 07/23/22  0900 07/23/22  0522 07/22/22  0846 07/22/22  0437 07/21/22  0904 07/21/22  0457   NA  --   --  141  --   --  141  --  139  --  141   POTASSIUM  --   --  4.0  --   --  4.1  --  4.0  --  3.9   CHLORIDE  --   --  108  --   --  107  --  107  --  109   THERON  --   --  8.8  --   --  8.7  --  8.7  --  9.1   CO2  --   --  28  --   --  29  --  25  " --  28   BUN  --   --  25  --   --  20  --  18  --  16   CR  --   --  0.72  --   --  0.78  --  0.69  --  0.70   * 179* 152* 244*   < > 150*   < > 181*   < > 185*    < > = values in this interval not displayed.     CBC  Recent Labs   Lab 07/24/22  0600 07/23/22  0522 07/22/22  0437 07/21/22  0457   WBC 7.3 7.4 7.3 7.8   RBC 2.85* 2.82* 2.70* 2.86*   HGB 8.7* 8.7* 8.2* 8.7*   HCT 27.1* 26.4* 25.1* 27.0*   MCV 95 94 93 94   MCH 30.5 30.9 30.4 30.4   MCHC 32.1 33.0 32.7 32.2   RDW 14.0 13.8 13.5 13.3    290 265 277     INRNo lab results found in last 7 days.  LFTsNo lab results found in last 7 days.   PANCNo lab results found in last 7 days.        Impression/Plan      77 year old female with a history of Cognitive disorder, CAD, DM1 c/b retinopathy and neuropathy, MDD admitted on 7/17/2022. She presents for cough and high blood sugar found to have pneumonia and hyperglycemia from not taking her insulin. Given her cognitive disorder and concerns for ability to care for herself, it was recommended she be admitted for treatment of pneumonia and optimization of blood sugar with PT/OT evaluations.     # Hospital Acquired Pneumonia:   - COVID test here negative, at home RAT was positive >1 week ago (when daughter was still in town, but patient is unsure of exact date).   Required 1-2 liters intermittently on 07/19, on RA on 07/20  On Room air   - Continue cefepime. Plan for 5 day treatment  - Add Duoneb prn     # DM1 complicated by neuropathy and retinopathy:   - Patient not checking pre-meal blood glucose, regularly choosing not to dose glargine, but always give insulin with meals  - Endocrine consulted. Appreciate recommendations.      # MDD  - continue lexapro     # CAD s/p stent to LAD  PTA on Aspirin, and Metoprolol XL.   - Continue       # Cognitive impairment  # Probable delirium d/t infection, metabolic abnormality  - concerns for ability to care for herself given complexity of DM1 and forgetfulness  -  PT /OT consulted  - Patient little confused, will add seroquel low dose  - Delirium prevention measures  - Treat infection, hyperglycemia  - PT/OT revalaution. Advised RN to obtain PT/OT consult  Family updated on 07/23/2024      # Euvolemic Hyponatremia  - likely SIADH in the setting of pneumonia  Na level normal on 07/19/2022     # Moderate malnutrition in the context of acute on chronic illness  supplemental liquids provided - [employer] and [employer] ordered to trial   Monitor labs, intakes, and weight trends     # Anemia of chronic disease  Hg 8.9 gm/dl on 07/19/2022  - monitor as needed     # Diet:   Regular diet  # DVT Prophylaxis: Enoxaparin (Lovenox) SQ  # Billy Catheter: Not present  # Central Lines: None  # Cardiac Monitoring: None  # Code Status:   DNR/DNI, confirmed with patient, per prior POLST [facility]    Disposition: PT/OT currently recommending TCU. Patient still has ongoing confusion. May require day or two     Pt's care was discussed with bedside RN, patient and  during Care Team Rounds.               Terrence Daley MD  Hospitalist ( Internal medicine)  Pager: [phone]

## 2022-07-25 NOTE — PROGRESS NOTES
"Pt's daughter Niya noted of pt's fall overnight, voiced no concerns at this time. Stated \"she falls a lot at home.\"   "

## 2022-07-25 NOTE — PROGRESS NOTES
CC note:  Found on bathroom floor on her knees, had pulled call light. She did not remember how she got there or if she had any injuries. Does not know if she hit her head. On exam, head and skull nontender on exam, no bruising or bumps. No spinal tenderness. Normal range of motion in bilateral upper and lower extremities, joints and bones without pain on palpation in bilateral upper and lower extremities. No need for imaging at this time. Continue to monitor clinically.

## 2022-07-25 NOTE — DISCHARGE INSTRUCTIONS
Diabetes plan for discharge  Check glucose before meals, bedtime, and 2AM.  WEAR SENSOR for LOW ALARMS  Tresiba 4 units every day (last dose at 8 pm on 8/3)--  Novolog 1 unit per 15 grams of carbohydrate with meals and snacks (last dose was at 9:30 AM on 8/4)  Novolog correction  For Pre-Meal  - 189 give 1 unit.   For Pre-Meal  - 239 give 2 units.   For Pre-Meal  - 289 give 3 units.   For Pre-Meal  - 339 give 4 units.   For Pre-Meal  - 389 give 5 units.   For Pre-Meal  - 439 give 6 units.   For Pre-Meal BG = or > 440 give 7 units.   BEDTIME-    For  - 299 give 1 units.   For  - 349 give 2 units.   For  - 399 give 3 units.   For BG = or > 400 give 4 units.

## 2022-07-25 NOTE — PROGRESS NOTES
Pt. Found found on bathroom floor in the room by RN on her knees after pt. pulled bathroom call light. No apparent injuries noted. She appears confused at baseline, no evidence of (+)LOC she denies any head trauma. Ralph of what clear pale yellow liquid on floor as well noted.RN  assisted pt. To shower chair x2 ASSIST where she denies pain. Full ROM in BUE, BLE extremities noted. VSS. Charge RN, MD notified. Bed alarm on, non-slip socks applied, pt reoriented to situation and demonstrated how to use call light for help. nursing will cont. To monitor And report to team any changes. increased visualization implemented.

## 2022-07-25 NOTE — PROGRESS NOTES
"[facility], [place]   Internal Medicine Daily Note           Interval History/Events     Overnight events reviewed  Question of fall   Patient reports she does not remember going to bathroom, and falling. She remembers being on the floor. She remembers calling for help.  She denies passing out. Does not remember hitting head.   She does not have pain or discomfort in any part of the body       Review of Systems        4 point ROS including Respiratory, CV, GI and , other than that noted above is negative      Medications   I have reviewed current medications  in the \"current medication\" section of Epic.  Relevant changes include:     Physical Exam   General:       Vital signs:    Blood pressure 116/61, pulse 96, temperature 98.2  F (36.8  C), temperature source Oral, resp. rate 16, height 1.524 m (5'), weight 50.2 kg (110 lb 10.7 oz), SpO2 93 %.  Estimated body mass index is 21.61 kg/m  as calculated from the following:    Height as of this encounter: 1.524 m (5').    Weight as of this encounter: 50.2 kg (110 lb 10.7 oz).      Intake/Output Summary (Last 24 hours) at 7/19/2022 1509  Last data filed at 7/19/2022 0830  Gross per 24 hour   Intake 603 ml   Output --   Net 603 ml        Constitutional: Laying in bed in no acute distress  Eye: No icterus, no pallor  Mouth/ENT: No oral mucosa  Cardiovascular: S1, S2 normal.   Respiratory: B/L CTA  GI: Soft, NT, BS+  :   Neurology: Alert, awake, and not oriented to day, date, and  month. No focal neuro derficit  Psych:   MSK:   Integumentary:   Heme/Lymph/Imm:      Laboratory and Imaging Studies     I have reviewed  laboratory and imaging studies in the Epic. Pertinent findings are as below:    BMP  Recent Labs   Lab 07/25/22  0517 07/25/22  0156 07/24/22  2115 07/24/22  1812 07/24/22  0839 07/24/22  0600 07/23/22  0900 07/23/22  0522 07/22/22  0846 07/22/22  0437     --   --   --   --  141  --  141  --  139   POTASSIUM 4.6  --   --   " --   --  4.0  --  4.1  --  4.0   CHLORIDE 107  --   --   --   --  108  --  107  --  107   THERON 8.8  --   --   --   --  8.8  --  8.7  --  8.7   CO2 29  --   --   --   --  28  --  29  --  25   BUN 23  --   --   --   --  25  --  20  --  18   CR 0.78  --   --   --   --  0.72  --  0.78  --  0.69   * 126* 130* 127*   < > 152*   < > 150*   < > 181*    < > = values in this interval not displayed.     CBC  Recent Labs   Lab 07/25/22  0517 07/24/22  0600 07/23/22  0522 07/22/22  0437   WBC 7.5 7.3 7.4 7.3   RBC 2.86* 2.85* 2.82* 2.70*   HGB 8.6* 8.7* 8.7* 8.2*   HCT 27.5* 27.1* 26.4* 25.1*   MCV 96 95 94 93   MCH 30.1 30.5 30.9 30.4   MCHC 31.3* 32.1 33.0 32.7   RDW 14.2 14.0 13.8 13.5    299 290 265     INRNo lab results found in last 7 days.  LFTsNo lab results found in last 7 days.   PANCNo lab results found in last 7 days.        Impression/Plan      77 year old female with a history of Cognitive disorder, CAD, DM1 c/b retinopathy and neuropathy, MDD admitted on 7/17/2022. She presents for cough and high blood sugar found to have pneumonia and hyperglycemia from not taking her insulin. Given her cognitive disorder and concerns for ability to care for herself, it was recommended she be admitted for treatment of pneumonia and optimization of blood sugar with PT/OT evaluations.     # Hospital Acquired Pneumonia:   - COVID test here negative, at home RAT was positive >1 week ago (when daughter was still in town, but patient is unsure of exact date).   Required 1-2 liters intermittently on 07/19, on RA on 07/20  On Room air   - Continue cefepime. Plan for 5 day treatment  - Add Duoneb prn     # DM1 complicated by neuropathy and retinopathy:   - Patient not checking pre-meal blood glucose, regularly choosing not to dose glargine, but always give insulin with meals  - Endocrine consulted. Appreciate recommendations.      # MDD  - continue lexapro     # CAD s/p stent to LAD  PTA on Aspirin, and Metoprolol XL.   -  Continue       # Cognitive impairment  # Probable delirium d/t infection, metabolic abnormality  - concerns for ability to care for herself given complexity of DM1 and forgetfulness  - PT /OT consulted  - Patient little confused, will add seroquel low dose  - Delirium prevention measures  - Treat infection, hyperglycemia  - PT/OT revalaution. Advised RN to obtain PT/OT consult  Family updated on 07/23/2024      # Euvolemic Hyponatremia  - likely SIADH in the setting of pneumonia  Na level normal on 07/19/2022     # Malnutrition  - nutrition consult     # Anemia of chronic disease  Hg 8.9 gm/dl on 07/19/2022  - monitor as needed     # Diet:   Regular diet  # DVT Prophylaxis: Enoxaparin (Lovenox) SQ  # Billy Catheter: Not present  # Central Lines: None  # Cardiac Monitoring: None  # Code Status:   DNR/DNI, confirmed with patient, per prior POLST [facility]    Disposition: PT/OT currently recommending TCU. Patient still has ongoing confusion. May require day or two     Pt's care was discussed with bedside RN, patient and  during Care Team Rounds.               Terrence Daley MD  Hospitalist ( Internal medicine)  Pager: [phone]

## 2022-07-25 NOTE — PROGRESS NOTES
Pts daughter and son were attempted to be notified via phonecall at 0615 regarding her unwitnessed fall this morning. Neither answered calls.

## 2022-07-25 NOTE — PLAN OF CARE
Shift: 07/25/22 9716-9045  Neuro: A/O x4. PERRL. Afebrile. C/o mild ab discomfort, declined med intervention, rest promoted.  CV: BP normotensive.  PU: on RA. LS clear.  GI: no BM this shift. Regular diet, good appetite, BG covered with sliding scale and carb count.  : voiding spontaneously in BR, incontinent at times.  Skin: intact, generalized bruising/scabs.  IV: R PIV  Other info: Assist x1, GB/walker. PT/OT recommend TCU, SW following. Daughter updated with plan of care. Discharge pending, will continue to monitor.     Goal Outcome Evaluation:    Plan of Care Reviewed With: patient, daughter

## 2022-07-25 NOTE — PROGRESS NOTES
Updated son via phone    Terrence Daley MD  [facility]  Contact information available via [facility] Paging/Directory

## 2022-07-25 NOTE — CONSULTS
Care Management Initial Consult    General Information  Assessment completed with: Patient    Type of CM/SW Visit: Initial Assessment    Primary Care Provider verified and updated as needed: No   Readmission within the last 30 days: previous discharge plan unsuccessful   Reason for Consult: discharge planning  Advance Care Planning: Advance Care Planning Reviewed: verified with patient, no concerns identified          Communication Assessment  Patient's communication style: spoken language (English or Bilingual)    Hearing Difficulty or Deaf: yes   Wear Glasses or Blind: yes    Cognitive  Cognitive/Neuro/Behavioral: .WDL except  Level of Consciousness: alert  Arousal Level: opens eyes spontaneously  Orientation: oriented x 4  Mood/Behavior: calm, cooperative  Best Language: 0 - No aphasia  Speech: clear, spontaneous, logical    Living Environment:   People in home: alone     Current living Arrangements: apartment      Able to return to prior arrangements: PT/OT recommending TCU, pt would prefer to discharge home with HC.       Family/Social Support:  Care provided by: self  Provides care for: no one  Marital Status: Single  Children          Description of Support System: Supportive, Involved    Support Assessment: Adequate family and caregiver support, Adequate social supports    Current Resources:   Patient receiving home care services: No     Community Resources: None  Equipment currently used at home: cane, straight, shower chair  Supplies currently used at home: None    Employment/Financial:  Employment Status: retired        Financial Concerns: No concerns identified   Referral to Financial Worker: No       Lifestyle & Psychosocial Needs:  Social Determinants of Health     Tobacco Use: Not on file   Alcohol Use: Not on file   Financial Resource Strain: Not on file   Food Insecurity: Not on file   Transportation Needs: Not on file   Physical Activity: Not on file   Stress: Not on file   Social Connections: Not  on file   Intimate Partner Violence: Not on file   Depression: Not on file   Housing Stability: Not on file       Functional Status:  Prior to admission patient needed assistance:   Dependent ADLs:: Ambulation-cane  Dependent IADLs:: Independent       Mental Health Status:  Mental Health Status: Current Concern - major depressive disorder. Mental Health Management: Medication    Chemical Dependency Status:  Chemical Dependency Status: No Current Concerns             Values/Beliefs:  Spiritual, Cultural Beliefs, Anabaptism Practices, Values that affect care: no               Additional Information:  Per H&P: Yanira Keene is a 77 year old female with a history of Cognitive disorder, CAD, DM1 c/b retinopathy and neuropathy, MDD admitted on 7/17/2022. She presents for cough and high blood sugar found to have pneumonia and hyperglycemia from not taking her insulin. Given her cognitive disorder and concerns for ability to care for herself, it was recommended she be admitted for treatment of pneumonia and optimization of blood sugar with PT/OT evaluations.    MIKAELA met with pt to complete initial assessment and discuss discharge plan. Pt lives alone in an apartment and was previously IND with all daily activities. Pt has a daughter who lives in [place] and a son who lives in [place]. Son works full time and could provide limited assistance at home. Discussed TCU recommendation. Pt feels that she has been in the hospital for some time and is ready to return home. Pt agreeable to home care referral. SW also left list of [facility] TCU's with pt for her to review as pt appeared somewhat open to going to TCU.     SW will continue to follow for discharge placement.      MYLENE Shipley, [facility]   [facility]  Pager: [phone]

## 2022-07-25 NOTE — PROGRESS NOTES
IP Diabetes Management  Daily Note           Assessment and Plan:   HPI: Yanira Keene is a 77 year old female with a history of Cognitive disorder, CAD, Type 1 diabetes c/b retinopathy and neuropathy and hypoglycemia unawareness (recent hx of increased hypoglycemia AND hospitalization for hyperglycemia), MDD, hypertension, dyslipidemia, and chronic anemia admitted on 2022 for treatment of pneumonia and hyperglycemia and determination of safe disposition given her cognitive disorder.    Endocrine will sign off at this time, page [phone] with concerns, questions, or change in plan.  Discharge plan in note from , if discharging to home , we have concerns about safety in patient managing her own insulin, would need to have family administering insulin.      Assessment:   1)Type 1 Diabetes Mellitus, uncontrolled (A1c 10.0)  2) cognitive concerns    Plan:    -Lantus 8 units daily at 1200   -Novolog 1:15g CHO coverage with meals and snacks/supplements   -Novolog medium intensity sliding scale TID AC and HS and 0200   -BG monitoring TID AC, HS, 0200   -hypoglycemia protocol   -carb counting protocol   -will discharge to TCU, no DM education needs   -if discharging to home, we have concerns about safety in patient managing her own insulin, would need to have family administering insulin.                    Recommendations for Discharge:   Instructions to patient were posted in AVS and discussed on day of discharge.   Medications and supplies are to be ordered by primary service on discharge.   *please use the DIAB non-branded discharge supply order set ([id number])*     -blood glucose monitoring three times daily before meals and at bedtime   -Lantus 8 units daily at 12pm daily  -Novolo units with each meal, 2 units with carbohydrate snack  -Novolog: medium intensity sliding scale (see below)    Meal time sliding scale insulin:  If -199 give 1 unit  If -249 give 2 units  If -299 give 3  units  If -349 give 4 units  If BG greater than 350 give 5 units    Bedtime sliding scale insulin:   If -249 give 1 unit  If -299 give 2 units  If -349 give 3 units  If BG greater than 350 give 4 units       -follow up with [facility] as scheduled on 8/4/22   -upon discharge, patient will need the following supplies: none needed if discharging to TCU          Interval History and Assessment:      BG stable on current regimen.  Eating well.  Still wants to go home, but plan is for TCU discharge.      Had unwitnessed fall this AM.  Denies any significant pain.  Watching t.v. this AM.      Labs: hgb 8.6.  GFR 78.       Recent Labs   Lab 07/25/22  0517 07/25/22  0156 07/24/22  2115 07/24/22  1812 07/24/22  1144 07/24/22  0839   * 126* 130* 127* 104* 179*       Current nutritional intake and type: Orders Placed This Encounter      Combination Diet Regular Diet Adult    Planned Procedures/surgeries: none  Steroid planning: none  D5W-containing solutions/medications: none    PTA Diabetes Regimen:   Pt reports last glargine 4 units in afternoon, maybe on 7/15/22  Humalog dosing unclear  **waiting on [employer] supplies    Discharge Planning: TBD, possible TCU           Diabetes History:   Type of Diabetes: Type 1 Diabetes Mellitus, with retinopathy, with neuropathy  Lab Results   Component Value Date    A1C 10.0 07/18/2022              Review of Systems:     Pertinent systems were negative except as noted in history.          Medications:     Current Facility-Administered Medications   Medication     acetaminophen (TYLENOL) tablet 325 mg     aspirin (ASA) chewable tablet 81 mg     benzonatate (TESSALON) capsule 100 mg     dextrose 5% and 0.9% NaCl infusion     glucose gel 15-30 g    Or     dextrose 50 % injection 25-50 mL    Or     glucagon injection 1 mg     enoxaparin ANTICOAGULANT (LOVENOX) injection 40 mg     escitalopram (LEXAPRO) tablet 10 mg     insulin aspart  (NovoLOG) injection (RAPID ACTING)     insulin aspart (NovoLOG) injection (RAPID ACTING)     insulin aspart (NovoLOG) injection (RAPID ACTING)     insulin aspart (NovoLOG) injection (RAPID ACTING)     insulin glargine (LANTUS PEN) injection 8 Units     ipratropium - albuterol 0.5 mg/2.5 mg/3 mL (DUONEB) neb solution 3 mL     lidocaine (LMX4) cream     lidocaine 1 % 0.1-1 mL     melatonin tablet 1 mg     metoprolol succinate ER (TOPROL XL) 24 hr tablet 25 mg     ondansetron (ZOFRAN ODT) ODT tab 4 mg    Or     ondansetron (ZOFRAN) injection 4 mg     polyethylene glycol (MIRALAX) Packet 17 g     QUEtiapine (SEROquel) quarter-tab 6.25 mg     sodium chloride (PF) 0.9% PF flush 3 mL     sodium chloride (PF) 0.9% PF flush 3 mL            Physical Exam:    /60 (BP Location: Right arm, Patient Position: Semi-Whelan's)   Pulse 97   Temp 98.8  F (37.1  C) (Oral)   Resp 16   Ht 1.524 m (5')   Wt 50.2 kg (110 lb 10.7 oz)   SpO2 93%   BMI 21.61 kg/m      General: pleasant, in no distress. Resting in bed.   HEENT: normocephalic, atraumatic. Oral mucous membranes moist.   Lungs: unlabored respiration, no cough  ABD: rounded, nondistended  Skin: warm and dry, no obvious lesions  MSK:  moves all extremities  Mental status:  alert, poor memory  Psych:   calm and appropriate interaction               Data:     Recent Labs   Lab 07/25/22  0517 07/25/22  0156 07/24/22  2115 07/24/22  1812 07/24/22  1144 07/24/22  0839   * 126* 130* 127* 104* 179*     Lab Results   Component Value Date    WBC 7.3 07/24/2022    WBC 7.4 07/23/2022    WBC 7.3 07/22/2022    HGB 8.7 (L) 07/24/2022    HGB 8.7 (L) 07/23/2022    HGB 8.2 (L) 07/22/2022    HCT 27.1 (L) 07/24/2022    HCT 26.4 (L) 07/23/2022    HCT 25.1 (L) 07/22/2022    MCV 95 07/24/2022    MCV 94 07/23/2022    MCV 93 07/22/2022     07/24/2022     07/23/2022     07/22/2022     Lab Results   Component Value Date     07/25/2022     07/24/2022    NA  141 07/23/2022    POTASSIUM 4.6 07/25/2022    POTASSIUM 4.0 07/24/2022    POTASSIUM 4.1 07/23/2022    CHLORIDE 107 07/25/2022    CHLORIDE 108 07/24/2022    CHLORIDE 107 07/23/2022    CO2 29 07/25/2022    CO2 28 07/24/2022    CO2 29 07/23/2022     (H) 07/25/2022     (H) 07/25/2022     (H) 07/24/2022     Lab Results   Component Value Date    BUN 23 07/25/2022    BUN 25 07/24/2022    BUN 20 07/23/2022     No results found for: TSH  No results found for: AST, ALT, GGT, ALKPHOS      35 minutes spent on the date of the encounter doing chart review, history and exam, documentation and further activities per the note      Over 50% of my time on the unit was spent counseling the patient and/or coordinating care regarding acute hyperglycemia management.  See note for details.      To contact Endocrine Diabetes service:   From 8AM-4PM: page inpatient diabetes provider that is following the patient  For questions or updates from 4PM-8AM: page the diabetes job code for on call fellow: [phone]

## 2022-07-26 NOTE — PLAN OF CARE
Goal Outcome Evaluation:        VS: Stable, Afebrile   O2: Adequate room air saturations   Output: OOB to BR with SBA to 1 assist   Last BM: Last documented BM - 7/24   Activity: OOB with 1 assist to the BR. Up in chair at bedside X 2   Skin: Intact   Pain: Denies   Neuro: Intact   Dressing:    Diet: Regular diet, Tray set up. Able to feed self   LDA: No PIV   Equipment: Walker, gait belt   Plan: 1:1 sitter discontinued today. Pt alert and oriented X 3 to 4 this am and afternoon. Pt now seem to be increasingly forgetful as the evening rolls around. Son visited and pt forgot conversation she had with the SW/ Care Coordinator re: placement. Pt stating a strong need - wants to go home.    Additional Info: Bed alarm and Chair alarm activated at all times. Pt will forget to request for help.

## 2022-07-26 NOTE — PROGRESS NOTES
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 She presents for cough and high blood sugar found to have pneumonia and hyperglycemia from not taking her insulin. Given her cognitive disorder and concerns for ability to care for herself, it was recommended she be admitted for treatment of pneumonia and optimization of blood sugar with PT/OT evaluations.     # Hospital Acquired Pneumonia:   - COVID test here negative, at home RAT was positive >1 week ago (when daughter was still in town, but patient is unsure of exact date).   Required 1-2 liters intermittently on , on RA on   On Room air   - Completed a 5 day treatment of Cefepime   - Duoneb prn ( Has not needed this in the last 7 days)      # DM1 complicated by neuropathy and retinopathy:   - Patient not checking pre-meal blood glucose, regularly choosing not to dose glargine, but always give insulin with meals  - Endocrine consulted. Appreciate recommendations.   - Endocrine recommendations as follows:  -blood glucose monitoring three times daily before meals and at bedtime   -Lantus 8 units daily at 12pm daily  -Novolo units with each meal, 2 units with carbohydrate snack  -Novolog: medium intensity sliding scale (see below)     Meal time sliding scale insulin:  If -199 give 1 unit  If -249 give 2 units  If -299 give 3 units  If -349 give 4 units  If BG greater than 350 give 5 units     Bedtime sliding scale insulin:              If -249 give 1 unit  If -299 give 2 units  If -349 give 3 units  If BG greater than 350 give 4 units                       -follow up with [facility] as scheduled on 22                 -upon discharge, patient  "will need the following supplies: none needed if discharging to TCU  Per Endocrine team \" if discharging to home , we have concerns about safety in patient managing her own insulin, would need to have family administering insulin\"            # MDD  - continue lexapro     # CAD s/p stent to LAD  PTA on Aspirin, and Metoprolol XL.   - Continue       # Cognitive impairment  # Probable delirium d/t infection, metabolic abnormality  - concerns for ability to care for herself given complexity of DM1 and forgetfulness  - PT /OT consulted  -  Low dose seroquel added ( 65.25 mg at bed time)   - Delirium prevention measures  - Treat infection, hyperglycemia   - Currently feeling much better. Therapy inclined to advice TCU         # Euvolemic Hyponatremia  - likely SIADH in the setting of pneumonia  Na level normal on 07/19/2022     # Malnutrition  - nutrition consult     # Anemia of chronic disease  Hg 8.4 gm/dl on 07/26/2022          Diet: Combination Diet Regular Diet Adult  Snacks/Supplements Adult: Other; Please send one Ensure Plus HP vanilla at 2 and Pharmapod at HS; Between Meals    DVT Prophylaxis: Enoxaparin (Lovenox) SQ  Billy Catheter: Not present  Central Lines: None  Cardiac Monitoring: None  Code Status: No CPR- Do NOT Intubate      Disposition Plan     To be determined      The patient's care was discussed with the Bedside Nurse, Care Coordinator/ and Patient's Family.    Cesia Costello MD  Hospitalist Service, GOLD TEAM [address] [place] [address]  Securely message with the Vocera Web Console (learn more here)  Text page via [facility]1-800-DOCTORS Paging/Directory   Please see signed in provider for up to date coverage information      Clinically Significant Risk Factors Present on Admission                      ______________________________________________________________________    Interval History    this is the first time I am meeting with Ms Keene. " Her H&P and progress of events has been reviewed. Sign out received from Dr. Daley  Patient feels well in herself. Denies chest pain/ SOB   Eating and drinking well     Data reviewed today: I reviewed all medications, new labs and imaging results over the last 24 hours. I personally reviewed no images or EKG's today.    Physical Exam   Vital Signs: Temp: 98.3  F (36.8  C) Temp src: Oral BP: 127/78 Pulse: 86   Resp: 18 SpO2: 95 % O2 Device: None (Room air)    Weight: 110 lbs 10.73 oz  General Appearance: Awake, alert and not in distress  Respiratory: Clear breath sounds bilaterally   Cardiovascular: Normal heart sounds. No murmurs   GI: Soft, non tender. Normal bowel sounds   Skin: No bruising or bleeding   Other:Awake, alert and orientated X 3       Data   Recent Labs   Lab 07/26/22  0727 07/26/22  0623 07/26/22  0348 07/25/22  1220 07/25/22  0517 07/24/22  0839 07/24/22  0600   WBC  --  7.0  --   --  7.5  --  7.3   HGB  --  8.4*  --   --  8.6*  --  8.7*   MCV  --  96  --   --  96  --  95   PLT  --  334  --   --  330  --  299   NA  --  139  --   --  140  --  141   POTASSIUM  --  4.6  --   --  4.6  --  4.0   CHLORIDE  --  108  --   --  107  --  108   CO2  --  26  --   --  29  --  28   BUN  --  21  --   --  23  --  25   CR  --  0.73  --   --  0.78  --  0.72   ANIONGAP  --  5  --   --  4  --  5   THERON  --  8.5  --   --  8.8  --  8.8   * 262* 218*   < > 194*   < > 152*    < > = values in this interval not displayed.

## 2022-07-26 NOTE — PROGRESS NOTES
Care Management Follow Up    Length of Stay (days): 8    Expected Discharge Date:  TBD      Concerns to be Addressed: discharge planning      Patient plan of care discussed at interdisciplinary rounds: Yes    Anticipated Discharge Disposition: TCU     Anticipated Discharge Services: Rehab   Anticipated Discharge DME: None    Patient/family educated on Medicare website which has current facility and service quality ratings: Yes  Education Provided on the Discharge Plan: Yes  Patient/Family in Agreement with the Plan: Yes    Referrals Placed by CM/SW:      [facility]   (Ph: [phone], Admissions: [phone], F: [phone])    Fatmata salgado [facility]   (Ph: [phone], F: [phone])  [facility] rudy [facility], ph: [phone] f: [phone]    Discontinued  [facility]   (Ph: [phone], Admissions: [phone], F: [phone])  7/26- declined, out of network with [facility]    Private pay costs discussed: Not applicable    Additional Information:  SW following for TCU placement. Pt is medically ready for discharge. SW sent referrals to the above TCU's based on pt's preference from [facility] list.     MYLENE Shipley, [facility]   [facility]  Pager: [phone]

## 2022-07-27 NOTE — PROGRESS NOTES
Care Management Follow Up    Length of Stay (days): 9    Expected Discharge Date:  TBD     Concerns to be Addressed: discharge planning   Patient plan of care discussed at interdisciplinary rounds: Yes    Anticipated Discharge Disposition: TCU     Anticipated Discharge Services: Rehab  Anticipated Discharge DME: None    Patient/family educated on Medicare website which has current facility and service quality ratings: Yes  Education Provided on the Discharge Plan: Yes  Patient/Family in Agreement with the Plan: Yes    Referrals Placed by CM/SW:      [facility]ee Godfrey [facility]   (Ph: [phone], F: [phone])  [facility] ph: [phone] f: [phone]  7/27- per Clari, they have a bed available. Need pt's SSN- daughter Niya will leave VM on unit SW phone with this info.      Discontinued  [facility]   (Ph: [phone], Admissions: [phone], F: [phone])  7/26- declined, out of network with [facility]   (Ph: [phone], Admissions: [phone], F: [phone])  7/27- cannot accept pt's with [facility] insurance at this time        Private pay costs discussed: Not applicable    Additional Information:  SW following for TCU placement. Received updates noted above. Spoke with pt in her room about [facility]- pt reports wanting to return home and that she is unsure how a TCU would benefit her. SW reiterated rationale for going to TCU. Pt appeared hesitant, stated she wants to go home to be with her cat (her son is currently taking care of the cat). Pt dobson not know her social security number, which is needed for TCU placement. Pt gave verbal permission for writer to contact her daughter Niya. SW spoke with Niya, [phone]. Niya will leave unit SW a voicemail with pt's SSN later today. Niya also noted that her mother has had memory issues for some time and Niya has been working with her on trying to get her to agree to move to an  "assisted living facility. Niya lives in [place] right now and reports that every time she visits her mother here there is a \"fire to put out\". Niya is moving back to [place] next spring and plans to move her mother into an [facility]ADOU at that time. She has been working with [facility] to determine what level of financial assistance pt qualifies for. She is also aware of [facility] and [facility] for [facility] placement. Niya is in agreement of pt going to rehab upon discharge from the hospital.     MIKAELA followed up with [facility] liaison- they submitted insurance auth for pt. She is unsure if auth will be approved given pt walked 220 ft today.     MIKAELA will continue to follow for discharge placement.      MYLENE Shipley, [facility]   [facility]  Pager: [phone]     "

## 2022-07-27 NOTE — PROGRESS NOTES
CLINICAL NUTRITION SERVICES - REASSESSMENT NOTE     Nutrition Prescription    RECOMMENDATIONS FOR MDs/PROVIDERS TO ORDER:  Encourage oral intakes, especially supplements.     Malnutrition Status:    Moderate malnutrition in the context of acute on chronic illness    Recommendations already ordered by Registered Dietitian (RD):  Discontinued current supplement, ordered trial of strawberry ensure    Future/Additional Recommendations:  Monitor labs, intakes, and weight trends.     EVALUATION OF THE PROGRESS TOWARD GOALS   Diet: Regular   Snacks/supplements: Ensure at 2 , [employer] at HS  Intake/Tolerance: % of documented meals.    Pt ordering (on average) 1823 kcal and 70 g protein per day per [employer]. Supplements add 1000 kcal and 52 g protein. With documented intakes is likely meeting 100% minimum estimated energy and protein needs.    Nutrition/GI: Patient states she is doing well, no questions. States she enjoys the supplements when she had tried them. Had multiple of both [employer] and Ensure in the room. Would like to try strawberry Ensure.     Weights: Pt with 5% weight loss over last year.   Wt Readings from Last Encounters:   07/27/22 50.7 kg (111 lb 12.4 oz)     07/20/22 50.2 kg (110 lb 10.7 oz)   07/11/22 51.3 kg (113 lb)   09/11/21 51.3 kg (113 lb)   05/12/21 53.1 kg (117 lb)      Labs reviewed: Glucose ranged  on 7/26-7/27    Medications reviewed    MALNUTRITION  % Intake: No decreased intake noted  % Weight Loss: Weight loss does not meet criteria  Subcutaneous Fat Loss: Facial region and Lower arm:  Mild-moderate  Muscle Loss: Temporal:  MIld, Facial & jaw region:  Mild-moderate, Lower arm  (forearm):  Moderate and Dorsal hand:  Moderate  Fluid Accumulation/Edema: None noted  Malnutrition Diagnosis: Moderate malnutrition in the context of acute on chronic illness    Previous Goals   PO > 75%  Evaluation: Mostly met    Previous Nutrition Diagnosis  Inadequate oral intake related to  poor/inconsistent appetite/intake as evidenced by evident muscle and fat wasting.  Evaluation: No change    CURRENT NUTRITION DIAGNOSIS  Inadequate oral intake related to previous poor/inconsistent appetite/intake as evidenced by evident muscle and fat wasting.    INTERVENTIONS  Implementation  Nutrition Interventions: supplemental drinks provided (Switched from current supplements to trial of strawberry ensure enlive)    Goals  Nutrition Goals:: PO  PO Goal:: PO >75%  PO goal status:: Ongoing, Progressing    Monitoring/Evaluation  Progress toward goals will be monitored and evaluated per protocol.    Eli Zendejas MS, RDN, LDN  RD pager: [phone]

## 2022-07-27 NOTE — PROGRESS NOTES
[facility]    Medicine Progress Note - Hospitalist Service, GOLD TEAM 16    Date of Admission:  2022    Assessment & Plan            77 year old female with a history of Cognitive disorder, CAD, DM1 c/b retinopathy and neuropathy, MDD admitted on 2022. She presents for cough and high blood sugar found to have pneumonia and hyperglycemia from not taking her insulin. Given her cognitive disorder and concerns for ability to care for herself, it was recommended she be admitted for treatment of pneumonia and optimization of blood sugar with PT/OT evaluations.     # Hospital Acquired Pneumonia:   - COVID test here negative, at home RAT was positive >1 week ago (when daughter was still in town, but patient is unsure of exact date).   Required 1-2 liters intermittently on , on RA on   On Room air   - Completed a 5 day treatment of Cefepime   - Duoneb prn ( Has not needed this in the last 7 days)      # DM1 complicated by neuropathy and retinopathy:   - Patient not checking pre-meal blood glucose, regularly choosing not to dose glargine, but always give insulin with meals  - Endocrine consulted. Appreciate recommendations.   - Endocrine recommendations as follows:  -blood glucose monitoring three times daily before meals and at bedtime   -Lantus 8 units daily at 12pm daily  -Novolo units with each meal, 2 units with carbohydrate snack  -Novolog: medium intensity sliding scale (see below)     Meal time sliding scale insulin:  If -199 give 1 unit  If -249 give 2 units  If -299 give 3 units  If -349 give 4 units  If BG greater than 350 give 5 units     Bedtime sliding scale insulin:              If -249 give 1 unit  If -299 give 2 units  If -349 give 3 units  If BG greater than 350 give 4 units         -follow up with [facility] as scheduled on 22  -upon discharge, patient will need the following  "supplies: none needed if discharging to TCU  Per Endocrine team \" if discharging to home , we have concerns about safety in patient managing her own insulin, would need to have family administering insulin\"      # MDD  - continue lexapro     # CAD s/p stent to LAD  PTA on Aspirin, and Metoprolol XL.   - Continue       # Cognitive impairment  # Probable delirium d/t infection, metabolic abnormality  - concerns for ability to care for herself given complexity of DM1 and forgetfulness  - PT /OT consulted  -  Low dose seroquel added ( 65.25 mg at bed time)   - Delirium prevention measures  - Treat infection, hyperglycemia   - Currently feeling much better. Therapy inclined to advice TCU         # Euvolemic Hyponatremia  - likely SIADH in the setting of pneumonia  Na level normal on 07/19/2022     # Malnutrition  - nutrition consult     # Anemia of chronic disease  Hg 8.4 gm/dl on 07/26/2022          Diet: Combination Diet Regular Diet Adult    DVT Prophylaxis: Enoxaparin (Lovenox) SQ  Billy Catheter: Not present  Central Lines: None  Cardiac Monitoring: None  Code Status: No CPR- Do NOT Intubate      Disposition Plan     To be determined      The patient's care was discussed with the Bedside Nurse, Care Coordinator/ and Patient's Family.    Cesia Costello MD  Hospitalist Service, [address]  Securely message with the Vocera Web Console (learn more here)  Text page via [facility] Paging/Directory   Please see signed in provider for up to date coverage information      Clinically Significant Risk Factors Present on Admission                      ______________________________________________________________________    Interval History    No new complaints overnight. Very pleasant this morning and awaiting breakfast   Denies chest pain or SOB  No fever or chills  Eating and drinking well     Data reviewed today: I reviewed all medications, " new labs and imaging results over the last 24 hours. I personally reviewed no images or EKG's today.    Physical Exam   Vital Signs: Temp: 98  F (36.7  C) Temp src: Oral BP: 131/65 Pulse: 85   Resp: 16 SpO2: 95 % O2 Device: None (Room air)    Weight: 111 lbs 12.37 oz  General Appearance: Awake, alert and not in distress  Respiratory: Clear breath sounds bilaterally   Cardiovascular: Normal heart sounds. No murmurs   GI: Soft, non tender. Normal bowel sounds   Skin: No bruising or bleeding   Other:Awake, alert and orientated X 3       Data   Recent Labs   Lab 07/27/22  0906 07/27/22  0543 07/27/22  0155 07/26/22  2122 07/26/22  0727 07/26/22  0623 07/25/22  1220 07/25/22  0517 07/24/22  0839 07/24/22  0600   WBC  --   --   --   --   --  7.0  --  7.5  --  7.3   HGB  --   --   --   --   --  8.4*  --  8.6*  --  8.7*   MCV  --   --   --   --   --  96  --  96  --  95   PLT  --  341  --   --   --  334  --  330  --  299   NA  --   --   --   --   --  139  --  140  --  141   POTASSIUM  --   --   --   --   --  4.6  --  4.6  --  4.0   CHLORIDE  --   --   --   --   --  108  --  107  --  108   CO2  --   --   --   --   --  26  --  29  --  28   BUN  --   --   --   --   --  21  --  23  --  25   CR  --  0.78  --   --   --  0.73  --  0.78  --  0.72   ANIONGAP  --   --   --   --   --  5  --  4  --  5   THERON  --   --   --   --   --  8.5  --  8.8  --  8.8   *  --  290* 289*   < > 262*   < > 194*   < > 152*    < > = values in this interval not displayed.

## 2022-07-27 NOTE — PLAN OF CARE
Goal Outcome Evaluation:    Plan of Care Reviewed With: patient     Overall Patient Progress: improving    Outcome Evaluation: Patient with adequate intakes, likely meeting needs.

## 2022-07-27 NOTE — PLAN OF CARE
[id number]  A&Ox4, forgetful. VSS. BG's this shift 326 and 210. Denied chest pain. Denied SOB. No c/o pain. Up to bathroom voiding spontaneously w/o difficulty. Last BM 7/24/22. Ax1 via walker and gait belt. Regular diet - carb count. Was able to take shower w/ OT assistance today.  Pt able to make her needs known - can be forgetful. Bed alarm on for safety. Call light within reach. Placement TBD.

## 2022-07-28 NOTE — PROGRESS NOTES
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 She presents for cough and high blood sugar found to have pneumonia and hyperglycemia from not taking her insulin. Given her cognitive disorder and concerns for ability to care for herself, it was recommended she be admitted for treatment of pneumonia and optimization of blood sugar with PT/OT evaluations.     # Hospital Acquired Pneumonia:   - COVID test here negative, at home RAT was positive >1 week ago (when daughter was still in town, but patient is unsure of exact date).   Required 1-2 liters intermittently on , on RA on   On Room air   - Completed a 5 day treatment of Cefepime   - Duoneb prn ( Has not needed this in the last 7 days)      # DM1 complicated by neuropathy and retinopathy:   - Patient not checking pre-meal blood glucose, regularly choosing not to dose glargine, but always give insulin with meals  - Endocrine consulted. Appreciate recommendations.   - Endocrine recommendations as follows:  -blood glucose monitoring three times daily before meals and at bedtime   -Lantus 8 units daily at 12pm daily  -Novolo units with each meal, 2 units with carbohydrate snack  -Novolog: medium intensity sliding scale (see below)     Meal time sliding scale insulin:  If -199 give 1 unit  If -249 give 2 units  If -299 give 3 units  If -349 give 4 units  If BG greater than 350 give 5 units     Bedtime sliding scale insulin:              If -249 give 1 unit  If -299 give 2 units  If -349 give 3 units  If BG greater than 350 give 4 units         -follow up with [facility] as scheduled on 22  -upon discharge, patient will need the following  "supplies: none needed if discharging to TCU  Per Endocrine team \" if discharging to home , we have concerns about safety in patient managing her own insulin, would need to have family administering insulin\"      # MDD  - continue lexapro     # CAD s/p stent to LAD  PTA on Aspirin, and Metoprolol XL.   - Continue       # Cognitive impairment  # Probable delirium d/t infection, metabolic abnormality  - concerns for ability to care for herself given complexity of DM1 and forgetfulness  - PT /OT consulted  -  Low dose seroquel added ( 65.25 mg at bed time)   - Delirium prevention measures  - Treat infection, hyperglycemia   - Currently feeling much better. Therapy inclined to advice TCU         # Euvolemic Hyponatremia  - likely SIADH in the setting of pneumonia  Na level normal on 07/19/2022     # Malnutrition  - nutrition consult     # Anemia of chronic disease  Hg 8.4 gm/dl on 07/26/2022          Diet: Combination Diet Regular Diet Adult    DVT Prophylaxis: Enoxaparin (Lovenox) SQ  Billy Catheter: Not present  Central Lines: None  Cardiac Monitoring: None  Code Status: No CPR- Do NOT Intubate      Disposition Plan     To be determined      The patient's care was discussed with the Bedside Nurse, Care Coordinator/ and Patient's Family.    Cesia Costello MD  Hospitalist Service, [address]  Securely message with the Vocera Web Console (learn more here)  Text page via [facility] Paging/Directory   Please see signed in provider for up to date coverage information      Clinically Significant Risk Factors Present on Admission                      ______________________________________________________________________    Interval History    No new complaints overnight. Pleasant, but forgetful   No fever or chills   Eating and drinking well  Denies pain or discomfort       Data reviewed today: I reviewed all medications, new labs and imaging " results over the last 24 hours. I personally reviewed no images or EKG's today.    Physical Exam   Vital Signs: Temp: 98.3  F (36.8  C) Temp src: Oral BP: (!) 140/68 Pulse: 86   Resp: 16 SpO2: 97 % O2 Device: None (Room air)    Weight: 111 lbs 12.37 oz  General Appearance: Awake, alert and not in distress  Respiratory: Clear breath sounds bilaterally   Cardiovascular: Normal heart sounds. No murmurs   GI: Soft, non tender. Normal bowel sounds   Skin: No bruising or bleeding   Other:Awake, alert and orientated X 2      Data   Recent Labs   Lab 07/28/22  0803 07/28/22  0208 07/27/22  2146 07/27/22  0906 07/27/22  0543 07/26/22  0727 07/26/22  0623 07/25/22  1220 07/25/22  0517 07/24/22  0839 07/24/22  0600   WBC  --   --   --   --   --   --  7.0  --  7.5  --  7.3   HGB  --   --   --   --   --   --  8.4*  --  8.6*  --  8.7*   MCV  --   --   --   --   --   --  96  --  96  --  95   PLT  --   --   --   --  341  --  334  --  330  --  299   NA  --   --   --   --   --   --  139  --  140  --  141   POTASSIUM  --   --   --   --   --   --  4.6  --  4.6  --  4.0   CHLORIDE  --   --   --   --   --   --  108  --  107  --  108   CO2  --   --   --   --   --   --  26  --  29  --  28   BUN  --   --   --   --   --   --  21  --  23  --  25   CR  --   --   --   --  0.78  --  0.73  --  0.78  --  0.72   ANIONGAP  --   --   --   --   --   --  5  --  4  --  5   THERON  --   --   --   --   --   --  8.5  --  8.8  --  8.8   * 186* 442*   < >  --    < > 262*   < > 194*   < > 152*    < > = values in this interval not displayed.

## 2022-07-28 NOTE — PLAN OF CARE
Problem: Respiratory Compromise (Pneumonia)  Goal: Effective Oxygenation and Ventilation  Outcome: Ongoing, Progressing  Intervention: Promote Airway Secretion Clearance  Recent Flowsheet Documentation  Taken 7/28/2022 0400 by Alexys Rm RN  Cough And Deep Breathing: done independently per patient  Taken 7/27/2022 2000 by Alexys Rm RN  Cough And Deep Breathing: done independently per patient  Intervention: Optimize Oxygenation and Ventilation  Recent Flowsheet Documentation  Taken 7/28/2022 0400 by Alexys Rm RN  Head of Bed (HOB) Positioning: HOB at 20-30 degrees  Taken 7/27/2022 2000 by Alexys Rm RN  Head of Bed (HOB) Positioning: HOB at 20-30 degrees     Problem: Fluid Imbalance (Pneumonia)  Goal: Fluid Balance  Outcome: Met     Problem: Infection (Pneumonia)  Goal: Resolution of Infection Signs and Symptoms  Outcome: Met     Problem: Malnutrition  Goal: Improved Nutritional Intake  Outcome: Met   Goal Outcome Evaluation:

## 2022-07-28 NOTE — PROGRESS NOTES
Social Work Progress Note    Social work called patients daughter, Niya, to see if she can get the hospital her moms social [facility] No answer but left voicemail for daughter to call back.    Referrals Placed by CM/SW:  renee Godfrey [facility]   P: [phone]  F: [phone]  [facility] liaison Clari, p: [phone], f: [phone]    7/27- per Clari, they have a bed available. Need pt's SSN- daughter Niya will leave VM on unit SW phone with this info.  7/28 - Awaiting f/u from patient daughter then will reach out to the facility regarding bed status     Addendum 12:59 PM  Sent Clari at [facility] additional therapy notes for insurance auth. Still awaiting follow up from patients daughter.     Social work will continue to follow and provide assistance to ensure a safe and timely discharge.     Shannon Cordoba, MYLENE, LGSW  8A and 10 ICU   [facility]   Phone: [phone]  Pager: [phone]

## 2022-07-29 NOTE — PLAN OF CARE
[id number] Shift:    Pt resting comfortably. BG slightly elevated at 230 prior to dinner; sliding scale and carb coverage provided. No complaints of pain or nausea. Repositions independently. Pt forgetful, but easily reoriented. Bed alarm on for safety.

## 2022-07-29 NOTE — PROGRESS NOTES
[facility]    Medicine Progress Note - Hospitalist Service, GOLD TEAM 16    Date of Admission:  2022    Assessment & Plan            77 year old female with a history of Cognitive disorder, CAD, DM1 c/b retinopathy and neuropathy, MDD admitted on 2022. She presents for cough and high blood sugar found to have pneumonia and hyperglycemia from not taking her insulin. Given her cognitive disorder and concerns for ability to care for herself, it was recommended she be admitted for treatment of pneumonia and optimization of blood sugar with PT/OT evaluations.     # Hospital Acquired Pneumonia:   - COVID test here negative, at home RAT was positive >1 week ago (when daughter was still in town, but patient is unsure of exact date).   Required 1-2 liters intermittently on , on RA on   On Room air   - Completed a 5 day treatment of Cefepime         # DM1 complicated by neuropathy and retinopathy:   - Patient not checking pre-meal blood glucose, regularly choosing not to dose glargine, but always give insulin with meals  - Endocrine consulted. Appreciate recommendations.   - Endocrine recommendations as follows:  -blood glucose monitoring three times daily before meals and at bedtime   -Lantus 8 units daily at 12pm daily, increased to 10 units   -Novolo units with each meal, 2 units with carbohydrate snack  -Novolog: medium intensity sliding scale (see below)     Meal time sliding scale insulin:  If -199 give 1 unit  If -249 give 2 units  If -299 give 3 units  If -349 give 4 units  If BG greater than 350 give 5 units     Bedtime sliding scale insulin:              If -249 give 1 unit  If -299 give 2 units  If -349 give 3 units  If BG greater than 350 give 4 units         -follow up with [facility] as scheduled on 22  -upon discharge, patient will need the following supplies: none needed if  "discharging to TCU  Per Endocrine team \" if discharging to home , we have concerns about safety in patient managing her own insulin, would need to have family administering insulin\"      # MDD  - continue lexapro     # CAD s/p stent to LAD  PTA on Aspirin, and Metoprolol XL.   - Continue       # Cognitive impairment  # Probable delirium d/t infection, metabolic abnormality  - concerns for ability to care for herself given complexity of DM1 and forgetfulness  - PT /OT consulted  -  Low dose seroquel added ( 65.25 mg at bed time)   - Delirium prevention measures  - Treat infection, hyperglycemia   - Currently feeling much better. Therapy inclined to advice TCU         # Euvolemic Hyponatremia  - likely SIADH in the setting of pneumonia  Na level normal on 07/19/2022     # Malnutrition  - nutrition consult     # Anemia of chronic disease  Hg 8.4 gm/dl on 07/26/2022          Diet: Combination Diet Regular Diet Adult    DVT Prophylaxis: Enoxaparin (Lovenox) SQ  Billy Catheter: Not present  Central Lines: None  Cardiac Monitoring: None  Code Status: No CPR- Do NOT Intubate      Disposition Plan     To be determined      The patient's care was discussed with the Bedside Nurse, Care Coordinator/ and Patient's Family.    Cesia Costello MD  Hospitalist Service, GOLD TEAM [address] [place] [address]  Securely message with the Vocera Web Console (learn more here)  Text page via [facility] Paging/Directory   Please see signed in provider for up to date coverage information      Clinically Significant Risk Factors Present on Admission                      ______________________________________________________________________    Interval History    No new complaints overnight.  Continues to be forgetful  Eating and drinking well  No fever or chills       Data reviewed today: I reviewed all medications, new labs and imaging results over the last 24 hours. I personally reviewed " no images or EKG's today.    Physical Exam   Vital Signs: Temp: 98.1  F (36.7  C) Temp src: Oral BP: 118/60 Pulse: 82   Resp: 16 SpO2: 94 % O2 Device: None (Room air)    Weight: 111 lbs 12.37 oz  General Appearance: Awake, alert and not in distress  Respiratory: Clear breath sounds bilaterally   Cardiovascular: Normal heart sounds. No murmurs   GI: Soft, non tender. Normal bowel sounds   Skin: No bruising or bleeding   Other:Awake, alert and orientated X 2      Data   Recent Labs   Lab 07/29/22  1212 07/29/22  0649 07/29/22  0140 07/27/22  0906 07/27/22  0543 07/26/22  0727 07/26/22  0623 07/25/22  1220 07/25/22  0517 07/24/22  0839 07/24/22  0600   WBC  --   --   --   --   --   --  7.0  --  7.5  --  7.3   HGB  --   --   --   --   --   --  8.4*  --  8.6*  --  8.7*   MCV  --   --   --   --   --   --  96  --  96  --  95   PLT  --   --   --   --  341  --  334  --  330  --  299   NA  --   --   --   --   --   --  139  --  140  --  141   POTASSIUM  --   --   --   --   --   --  4.6  --  4.6  --  4.0   CHLORIDE  --   --   --   --   --   --  108  --  107  --  108   CO2  --   --   --   --   --   --  26  --  29  --  28   BUN  --   --   --   --   --   --  21  --  23  --  25   CR  --   --   --   --  0.78  --  0.73  --  0.78  --  0.72   ANIONGAP  --   --   --   --   --   --  5  --  4  --  5   THERON  --   --   --   --   --   --  8.5  --  8.8  --  8.8   * 240* 161*   < >  --    < > 262*   < > 194*   < > 152*    < > = values in this interval not displayed.

## 2022-07-29 NOTE — PLAN OF CARE
Goal Outcome Evaluation:    Plan of Care Reviewed With: patient     Overall Patient Progress: improving           Pt A/O X 4. Afebrile. Lung sounds is clear bilaterally with both   anterior and posterior. Denies nausea, shortness of breath, or chest pain.  Vital signs:  Temp: 98.1  F (36.7  C) Temp src: Oral BP: 118/60 Pulse: 82   Resp: 16 SpO2: 94 % O2 Device: None (Room air)    Output:       Bowels audible  in all four quadrants. Voids spontaneously without   difficulty in the toilet .     Activity:     Assist x1 with walker.     Skin: No skin issues noted.     Pain:     Denied pain.     CMS:       CMS and Neuro's are intact, generalized weakness. Denies numbness and tingling in all extremities.      Dressing:     None     Diet:       Pt is on a regular diet and appetite was in  this shift.       LDA:     No PIV      Equipment:     Walker   Plan:       Pt is able to make needs known and the call light is within the pt's reach. Continue to monitor.    Pending to be discharged  to TCU today.   Additional Info:

## 2022-07-29 NOTE — PROGRESS NOTES
Social Work Progress Note     Social work awaiting to hear back from the insurance company to see if patients auth has gone through.     Accepted:  renee Godfrey [facility] [place]   P: [phone]  F: [phone]  [facility] liaison (Clari)  P: [phone]  F: [phone]     7/27- per Clari, they have a bed available. Need pt's SSN- daughter Niya will leave VM on unit  phone with this info.  7/28 - Awaiting f/u from patient daughter then will reach out to the facility regarding bed status   7/29 - Peer to peer completed with insurance company and hospitalist. Per hospitalist, auth had to be reviewed with the clinical director prior to accepting. Awaiting a call back from [employer] with an update. There should be a bed today if patient is approved.     Addendum 3:48 PM:  Social work informed by Clari at [facility] that insurance has denied auth. MIKAELA provided daughter with the [facility] appeal phone # to follow up if that is what they would like to do. From SW and daughters conversation, it appears as though the family will most likely appeal. Daughter is going to take the weekend to think on next steps and what she would like to do moving forward. SW will follow up with daughter on Monday.      Social work will continue to follow and provide assistance to ensure a safe and timely discharge.      MYLENE Cabral, LGSW  8A and 10 ICU   [facility]   Phone: [phone]  Pager: [phone]

## 2022-07-30 NOTE — PLAN OF CARE
/64 (BP Location: Right arm, Patient Position: Supine)   Pulse 105   Temp 98  F (36.7  C) (Oral)   Resp 16   Ht 1.524 m (5')   Wt 50.7 kg (111 lb 12.4 oz)   SpO2 96%   BMI 21.83 kg/m      A/Ox3/4. With off and on confusion/forgetfulness. VSS on Room Air. Denied SOB, CP, Cough, N/V, N/T, Dizziness, Headache. Pt denied pain this shift. Regular diet, thin liquids, takes pills whole. Continent of B/B this shift. LBM 7/29/22. SBA with walker and gait belt - pt refused gait belt. Skin is intact. No dressings. No LDA's. Call light within reach, able to make needs known. Appears to be sleeping during rounds. Continue POC with expected discharge pending assisted living/TCU placement.    BG 0200-60 treated with 15g of Glucose Gel and one apple juice given.         [id number]         [id number]

## 2022-07-30 NOTE — PROGRESS NOTES
"[facility]    Medicine Progress Note - Hospitalist Service, GOLD TEAM 16    Date of Admission:  2022    Assessment & Plan            77 year old female with a history of Cognitive disorder, CAD, DM1 c/b retinopathy and neuropathy, MDD admitted on 2022. She presents for cough and high blood sugar found to have pneumonia and hyperglycemia from not taking her insulin. Given her cognitive disorder and concerns for ability to care for herself, it was recommended she be admitted for treatment of pneumonia and optimization of blood sugar with PT/OT evaluations.     # Hospital Acquired Pneumonia:   - COVID test here negative, at home RAT was positive >1 week ago (when daughter was still in town, but patient is unsure of exact date).   Required 1-2 liters intermittently on , on RA on   On Room air   - Completed a 5 day treatment of Cefepime         # DM1 complicated by neuropathy and retinopathy:   - Patient not checking pre-meal blood glucose, regularly choosing not to dose glargine, but always give insulin with meals  - Endocrine consulted.   - Endocrine recommendations as follows:  -blood glucose monitoring three times daily before meals and at bedtime   -Lantus 8 units daily at 12pm daily  -Novolo units with each meal, 2 units with carbohydrate snack  -Novolog: medium intensity sliding scale (see below)     Meal time sliding scale insulin:  If -199 give 1 unit  If -249 give 2 units  If -299 give 3 units  If -349 give 4 units  If BG greater than 350 give 5 units     Bedtime sliding scale insulin:  If -249 give 1 unit  If -299 give 2 units  If -349 give 3 units  If BG greater than 350 give 4 units         -follow up with [facility] as scheduled on 22  -upon discharge, patient will need the following supplies: none needed if discharging to TCU  Per Endocrine team \" if discharging to home , we " "have concerns about safety in patient managing her own insulin, would need to have family administering insulin\"      # MDD  - continue lexapro     # CAD s/p stent to LAD  PTA on Aspirin, and Metoprolol XL.   - Continue       # Cognitive impairment  # Probable delirium d/t infection, metabolic abnormality  - concerns for ability to care for herself given complexity of DM1 and forgetfulness  - PT /OT consulted  -  Low dose seroquel added ( 65.25 mg at bed time)   - Delirium prevention measures  - Treat infection, hyperglycemia   - Currently feeling much better. Therapy inclined to advice TCU         # Euvolemic Hyponatremia  - likely SIADH in the setting of pneumonia  Na level normal on 07/19/2022     # Malnutrition  - nutrition consult     # Anemia of chronic disease  Hg 8.4 gm/dl on 07/26/2022          Diet: Combination Diet Regular Diet Adult    DVT Prophylaxis: Enoxaparin (Lovenox) SQ  Billy Catheter: Not present  Central Lines: None  Cardiac Monitoring: None  Code Status: No CPR- Do NOT Intubate      Disposition Plan     To be determined      The patient's care was discussed with the Bedside Nurse, Care Coordinator/ and Patient's Family.    Cesia Costello MD  Hospitalist Service, GOLD TEAM [address] [place] [address]  Securely message with the Vocera Web Console (learn more here)  Text page via [facility] Paging/Directory   Please see signed in provider for up to date coverage information      Clinically Significant Risk Factors Present on Admission                      ______________________________________________________________________    Interval History     No acute complaints overnight. Has low sugar at 60 at 130 am in the morning, and received oral dextrose. No fever or chills. Continues to be pleasant and forgetful    Data reviewed today: I reviewed all medications, new labs and imaging results over the last 24 hours. I personally reviewed no images " or EKG's today.    Physical Exam   Vital Signs: Temp: 98  F (36.7  C) Temp src: Oral BP: 136/72 Pulse: 80   Resp: 16 SpO2: 95 % O2 Device: None (Room air)    Weight: 111 lbs 12.37 oz  General Appearance: Awake, alert and not in distress  Respiratory: Clear breath sounds bilaterally   Cardiovascular: Normal heart sounds. No murmurs   GI: Soft, non tender. Normal bowel sounds   Skin: No bruising or bleeding   Other:Awake, alert and orientated X 2      Data   Recent Labs   Lab 07/30/22  0829 07/30/22  0536 07/30/22  0514 07/30/22  0221 07/27/22  0906 07/27/22  0543 07/26/22  0727 07/26/22  0623 07/25/22  1220 07/25/22  0517 07/24/22  0839 07/24/22  0600   WBC  --   --   --   --   --   --   --  7.0  --  7.5  --  7.3   HGB  --   --   --   --   --   --   --  8.4*  --  8.6*  --  8.7*   MCV  --   --   --   --   --   --   --  96  --  96  --  95   PLT  --  356  --   --   --  341  --  334  --  330  --  299   NA  --   --   --   --   --   --   --  139  --  140  --  141   POTASSIUM  --   --   --   --   --   --   --  4.6  --  4.6  --  4.0   CHLORIDE  --   --   --   --   --   --   --  108  --  107  --  108   CO2  --   --   --   --   --   --   --  26  --  29  --  28   BUN  --   --   --   --   --   --   --  21  --  23  --  25   CR  --  0.78  --   --   --  0.78  --  0.73  --  0.78  --  0.72   ANIONGAP  --   --   --   --   --   --   --  5  --  4  --  5   THERON  --   --   --   --   --   --   --  8.5  --  8.8  --  8.8   *  --  178* 112*   < >  --    < > 262*   < > 194*   < > 152*    < > = values in this interval not displayed.

## 2022-07-30 NOTE — PLAN OF CARE
Pt waiting for TCU placement; see SW note. Bed alarm on for safety as pt doesn't always remember to call. Assist with tray set up for meals. Up with SBA, walker, and gait belt. Continent of bowel and bladder; LBM yesterday. Lantus decreased today d/t hypoglycemia episodes in the middle of the night.

## 2022-07-31 NOTE — PLAN OF CARE
Goal Outcome Evaluation:  Patient oriented except to month and year.  Forgetful throughout the day.  Blood sugars very labile.  This afternoon had a blood sugar of 429 which was treated with 6 units of insulin.  After 45min the BG was rechecked and 2 values were obtained one right after the other 432 and 521.  MD was paged and d/t the difference in values, a lab draw was ordered.  Carb coverage ordered for dinner as the BG was so elevated.    David Schwartz RN

## 2022-07-31 NOTE — PROGRESS NOTES
Pt had hypoglycemic episode x1 upon scheduled checks B<63<80. No AMS noted, at baseline Neuro status. VSS. AJ given 268cc immediately, gluclose gel x1. She is sitting up in bed eating crackers w/PB at this time. NAD noted at this time. Nursing will cont. To monitor and report to team any changes.

## 2022-07-31 NOTE — PROGRESS NOTES
[facility]    Medicine Progress Note - Hospitalist Service, GOLD TEAM 16    Date of Admission:  2022    Assessment & Plan            77 year old female with a history of Cognitive disorder, CAD, DM1 c/b retinopathy and neuropathy, MDD admitted on 2022. She presents for cough and high blood sugar found to have pneumonia and hyperglycemia from not taking her insulin. Given her cognitive disorder and concerns for ability to care for herself, it was recommended she be admitted for treatment of pneumonia and optimization of blood sugar with PT/OT evaluations.     # Hospital Acquired Pneumonia:   - COVID test here negative, at home RAT was positive >1 week ago (when daughter was still in town, but patient is unsure of exact date).   Required 1-2 liters intermittently on , on RA on   On Room air   - Completed a 5 day treatment of Cefepime         # DM1 complicated by neuropathy and retinopathy:   - Patient not checking pre-meal blood glucose, regularly choosing not to dose glargine, but always give insulin with meals  - Endocrine consulted.   - Endocrine recommendations as follows:  -blood glucose monitoring three times daily before meals and at bedtime   -Lantus 8 units daily at 12pm daily  -Novolo units with each meal, 2 units with carbohydrate snack  -Novolog: medium intensity sliding scale (see below)     Meal time sliding scale insulin:  If -199 give 1 unit  If -249 give 2 units  If -299 give 3 units  If -349 give 4 units  If BG greater than 350 give 5 units     Bedtime sliding scale insulin:  If -249 give 1 unit  If -299 give 2 units  If -349 give 3 units  If BG greater than 350 give 4 units       Given persistent hypoglycemia at midnight, we have discontinued the night time carbohydrate coverage       -follow up with [facility] as scheduled on 22  -upon discharge, patient will need the  "following supplies: none needed if discharging to TCU  Per Endocrine team \" if discharging to home , we have concerns about safety in patient managing her own insulin, would need to have family administering insulin\"      # MDD  - continue lexapro     # CAD s/p stent to LAD  PTA on Aspirin, and Metoprolol XL.   - Continue       # Cognitive impairment  # Probable delirium d/t infection, metabolic abnormality  - concerns for ability to care for herself given complexity of DM1 and forgetfulness  - PT /OT consulted  -  Low dose seroquel added ( 65.25 mg at bed time)   - Delirium prevention measures  - Treat infection, hyperglycemia   - Currently feeling much better. Therapy inclined to advice TCU         # Euvolemic Hyponatremia, Resolved   - likely SIADH in the setting of pneumonia  Na level normal on 07/19/2022     # Malnutrition  - nutrition consult     # Anemia of chronic disease  Hg 8.4 gm/dl on 07/26/2022          Diet: Combination Diet Regular Diet Adult    DVT Prophylaxis: Enoxaparin (Lovenox) SQ  Billy Catheter: Not present  Central Lines: None  Cardiac Monitoring: None  Code Status: No CPR- Do NOT Intubate      Disposition Plan     Expected Discharge Date: 08/02/2022          To be determined      The patient's care was discussed with the Bedside Nurse, Care Coordinator/ and Patient's Family.    Cesia Costello MD  Hospitalist Service, GOLD TEAM [address] [place] [address]  Securely message with the Vocera Web Console (learn more here)  Text page via [employer] Paging/Directory   Please see signed in provider for up to date coverage information      Clinically Significant Risk Factors Present on Admission                      ______________________________________________________________________    Interval History    Patient had a recurrence of low blood sugar overnight and needed dextrose administered. Patient does not have any recollection of this   " Denies chest pain or SOB   No fever or chills  Eating and drinking well       Data reviewed today: I reviewed all medications, new labs and imaging results over the last 24 hours. I personally reviewed no images or EKG's today.    Physical Exam   Vital Signs: Temp: 98.1  F (36.7  C) Temp src: Oral BP: 134/70 Pulse: 85   Resp: 16 SpO2: 96 % O2 Device: None (Room air)    Weight: 111 lbs 12.37 oz  General Appearance: Awake, alert and not in distress  Respiratory: Clear breath sounds bilaterally   Cardiovascular: Normal heart sounds. No murmurs   GI: Soft, non tender. Normal bowel sounds   Skin: No bruising or bleeding   Other:Awake, alert and orientated X 2      Data   Recent Labs   Lab 07/31/22  0822 07/31/22  0207 07/30/22  2234 07/30/22  0829 07/30/22  0536 07/27/22  0906 07/27/22  0543 07/26/22  0727 07/26/22  0623 07/25/22  1220 07/25/22  0517   WBC  --   --   --   --   --   --   --   --  7.0  --  7.5   HGB  --   --   --   --   --   --   --   --  8.4*  --  8.6*   MCV  --   --   --   --   --   --   --   --  96  --  96   PLT  --   --   --   --  356  --  341  --  334  --  330   NA  --   --   --   --   --   --   --   --  139  --  140   POTASSIUM  --   --   --   --   --   --   --   --  4.6  --  4.6   CHLORIDE  --   --   --   --   --   --   --   --  108  --  107   CO2  --   --   --   --   --   --   --   --  26  --  29   BUN  --   --   --   --   --   --   --   --  21  --  23   CR  --   --   --   --  0.78  --  0.78  --  0.73  --  0.78   ANIONGAP  --   --   --   --   --   --   --   --  5  --  4   THERON  --   --   --   --   --   --   --   --  8.5  --  8.8   * 211* 80   < >  --    < >  --    < > 262*   < > 194*    < > = values in this interval not displayed.

## 2022-07-31 NOTE — PLAN OF CARE
Problem: Fluid Imbalance (Pneumonia)  Goal: Fluid Balance  Outcome: Met     Problem: Infection (Pneumonia)  Goal: Resolution of Infection Signs and Symptoms  Outcome: Met     Problem: Respiratory Compromise (Pneumonia)  Goal: Effective Oxygenation and Ventilation  Outcome: Met  Intervention: Promote Airway Secretion Clearance  Recent Flowsheet Documentation  Taken 7/31/2022 0400 by Alexys Rm RN  Cough And Deep Breathing: done independently per patient  Taken 7/30/2022 2000 by Alexys Rm RN  Cough And Deep Breathing: done independently per patient  Intervention: Optimize Oxygenation and Ventilation  Recent Flowsheet Documentation  Taken 7/31/2022 0400 by Alexys Rm RN  Head of Bed (HOB) Positioning: HOB at 20-30 degrees  Taken 7/30/2022 2010 by Alexys Rm RN  Head of Bed (HOB) Positioning: HOB at 20-30 degrees  Taken 7/30/2022 2000 by Alexys Rm RN  Head of Bed (HOB) Positioning: HOB at 20-30 degrees     Problem: Malnutrition  Goal: Improved Nutritional Intake  Outcome: Met   Goal Outcome Evaluation:

## 2022-07-31 NOTE — PROGRESS NOTES
Brief Medicine Cross Cover Note    Nursing paged that patient's blood sugar 432 and on repeat 521 this evening. Patient just received 6 units on correctional sliding scale per nursing.     Patient's insulin regimen was decreased today as patient became hypoglycemic to 37 last night.     Discussed with endocrinology. Ordered BMP and repeat blood glucose blood draw stat. No signs of DKA, but glucose continues to rise now to 553 despite receiving 6 units of novolog at 1718. Will hold all subcutaneous insulin and start an insulin drip and reconsult endocrinology in the AM for labile blood glucose.     Zarina Angel [facility]

## 2022-08-01 NOTE — PROGRESS NOTES
[facility]    Medicine Progress Note - Hospitalist Service, GOLD TEAM 16    Date of Admission:  7/17/2022    Assessment & Plan            77 year old female with a history of Cognitive disorder, CAD, DM1 c/b retinopathy and neuropathy, MDD admitted on 7/17/2022. She presents for cough and high blood sugar found to have pneumonia and hyperglycemia from not taking her insulin. Given her cognitive disorder and concerns for ability to care for herself, it was recommended she be admitted for treatment of pneumonia and optimization of blood sugar with PT/OT evaluations.     # Hospital Acquired Pneumonia:   - COVID test here negative, at home RAT was positive >1 week ago (when daughter was still in town, but patient is unsure of exact date).   Required 1-2 liters intermittently on 07/19, on RA on 07/20  On Room air   - Completed a 5 day treatment of Cefepime         # DM1 complicated by neuropathy and retinopathy:   Patient was stabilized on a regimen, but this had to be revisited again due to significantly fluctuating blood sugars    - Endocrine recommendations as follows:    -Lantus 8-10 units daily at 1200--> revise to Tresiba U100 8 units daily Q24h ASAP                                  -stop IV insulin infusion FOUR hours after first dose of Tresiba                 -Novolog 1:15g CHO coverage with breakfast, lunch and snacks/supplements, and plan for 1 unit per 25 grams for supper coverage                 -Novolog medium intensity sliding scale TID AC and HS and 0200 AFTER the IV insulin has stopped, start time 1800                 -BG monitoring TID AC, HS, 0200                 -hypoglycemia protocol                 -carb counting protocol                 -will discharge to TCU, no DM education needs                 -unsafe for patient to independently manage /administer her own  insulin.  She would benefit from carb counted Novolog                    -follow up with  "[facility] as scheduled on 8/4/22  -upon discharge, patient will need the following supplies: none needed if discharging to TCU  Per Endocrine team \" if discharging to home , we have concerns about safety in patient managing her own insulin, would need to have family administering insulin\"      # MDD  - continue lexapro     # CAD s/p stent to LAD  PTA on Aspirin, and Metoprolol XL.   - Continue       # Cognitive impairment  # Probable delirium d/t infection, metabolic abnormality  - concerns for ability to care for herself given complexity of DM1 and forgetfulness  - PT /OT consulted  -  Low dose seroquel added ( 65.25 mg at bed time)   - Delirium prevention measures  - Treat infection, hyperglycemia   - Currently feeling much better. Therapy inclined to advice TCU         # Euvolemic Hyponatremia, Resolved   - likely SIADH in the setting of pneumonia  Na level normal on 07/19/2022     # Malnutrition  - nutrition consult     # Anemia of chronic disease  Hg 8.4 gm/dl on 07/26/2022          Diet: Combination Diet Regular Diet Adult    DVT Prophylaxis: Enoxaparin (Lovenox) SQ  Billy Catheter: Not present  Central Lines: None  Cardiac Monitoring: None  Code Status: No CPR- Do NOT Intubate      Disposition Plan     Expected Discharge Date: 08/02/2022          To be determined      The patient's care was discussed with the Bedside Nurse, Care Coordinator/ and Patient's Family.    Cesia Costello MD  Hospitalist Service, GOLD TEAM [address] [place] [address]  Securely message with the Vocera Web Console (learn more here)  Text page via [email or web address] Paging/Directory   Please see signed in provider for up to date coverage information      Clinically Significant Risk Factors Present on Admission                      ______________________________________________________________________    Interval History    Overnight events noted. Patient with extremely high " and low sugars needing initiation of IV insulin drip overnight   Patient is fairly oblivious to any of this  She is very pleasant  Denies chest pain or SOB  Eating and drinking well     Data reviewed today: I reviewed all medications, new labs and imaging results over the last 24 hours. I personally reviewed no images or EKG's today.    Physical Exam   Vital Signs: Temp: 98.3  F (36.8  C) Temp src: Oral BP: 102/57 Pulse: 81   Resp: 16 SpO2: 94 % O2 Device: None (Room air)    Weight: 111 lbs 12.37 oz  General Appearance: Awake, alert and not in distress  Respiratory: Clear breath sounds bilaterally   Cardiovascular: Normal heart sounds. No murmurs   GI: Soft, non tender. Normal bowel sounds   Skin: No bruising or bleeding   Other:Awake, alert and orientated X 2      Data   Recent Labs   Lab 08/01/22  1337 08/01/22  1303 08/01/22  1231 07/31/22  2148 07/31/22  1857 07/30/22  0829 07/30/22  0536 07/27/22  0906 07/27/22  0543 07/26/22  0727 07/26/22  0623   WBC  --   --   --   --   --   --   --   --   --   --  7.0   HGB  --   --   --   --   --   --   --   --   --   --  8.4*   MCV  --   --   --   --   --   --   --   --   --   --  96   PLT  --   --   --   --   --   --  356  --  341  --  334   NA  --   --   --   --  133  --   --   --   --   --  139   POTASSIUM  --   --   --   --  4.5  --   --   --   --   --  4.6   CHLORIDE  --   --   --   --  98  --   --   --   --   --  108   CO2  --   --   --   --  28  --   --   --   --   --  26   BUN  --   --   --   --  30  --   --   --   --   --  21   CR  --   --   --   --  0.68  --  0.78  --  0.78  --  0.73   ANIONGAP  --   --   --   --  7  --   --   --   --   --  5   THERON  --   --   --   --  8.8  --   --   --   --   --  8.5   GLC 66* 49* 104*   < > 553*   < >  --    < >  --    < > 262*    < > = values in this interval not displayed.

## 2022-08-01 NOTE — PLAN OF CARE
/57 (BP Location: Left arm)   Pulse 81   Temp 98.3  F (36.8  C) (Oral)   Resp 16   Ht 1.524 m (5')   Wt 50.7 kg (111 lb 12.4 oz)   SpO2 94%   BMI 21.83 kg/m      Pt alert and oriented x4, denies any pain this shift. Pt BG uncontrolled, BG 49 @ 1300, 30g glucose given per orders.  @ 1500. No response from provider. BG rechecked @ 1645 418. Charge nurse notified. Insulin correction dose given per MAR orders 5 units. Endocrine contacted regarding insulin drip and transition to sub-Q insulin. Pt skipped supper.  @ 1745. Pt O2 SAT >90% on room air. PIV right lower forearm SL. Pt no longer has a sitter. Pt is able to make needs known, uses call light appropriately. Continue with the POC.

## 2022-08-01 NOTE — PROGRESS NOTES
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 IDS signed off 7/25.  Re-called 8/1.      Assessment:   1)Type 1 Diabetes Mellitus, uncontrolled (A1c 10.0)  2) cognitive concerns    Plan:    -maintain in algorithm ONE on the IV insulin infusion   -Lantus 8-10 units daily at 1200--> revise to Tresiba U100 8 units daily Q24h ASAP      -stop IV insulin infusion FOUR hours after first dose of Tresiba   -Novolog 1:15g CHO coverage with breakfast, lunch and snacks/supplements, and plan for 1 unit per 25 grams for supper coverage   -Novolog medium intensity sliding scale TID AC and HS and 0200 AFTER the IV insulin has stopped, start time 1800   -BG monitoring TID AC, HS, 0200   -hypoglycemia protocol   -carb counting protocol   -will discharge to TCU, no DM education needs   -unsafe for patient to independently manage /administer her own  insulin.  She would benefit from carb counted Novolog                      -follow up with [facility] as scheduled on 8/4/22--> will need to be rescheduled if discharge delayed beyond appt date/time     -upon discharge, patient will need the following supplies: none needed if discharging to TCU - [facility]?? Delivered yet?    Discussed w/ pt, RN, and primary team        Interval History and Assessment:   When last seen on 7/25-->      More recently-->      Low BG followed increase in glargine and possibly correction of a post-prandial peak glucose.  Then hyperglycemic and up to 400s needing IV insulin start last evening.  Note that  "1230 intake yesterday and coverage was \"rounded down\" --details of carb intake are not available.  Meal ticket shows 42 grams but unknown what portion Rossana consumed.  She always has a salad, but her recall of the pasta intake is poor.  Also, no BG between 0800 and 1630 .  She says Lantus gives her trouble.  Hasn't tried other basal insulins.  Open to Tresiba.  She is awaiting placement.  TCU has declined-->??[facility]    Tresiba copay 35.00         recs--  Recommendations for Discharge:   Instructions to patient were posted in AVS and discussed on day of discharge.   Medications and supplies are to be ordered by primary service on discharge.   *please use the DIAB non-branded discharge supply order set ([id number])*     -blood glucose monitoring three times daily before meals and at bedtime   -Lantus 8 units daily at 12pm daily  -Novolo units with each meal, 2 units with carbohydrate snack--> BETTER to have carb counted insulin for safety  -Novolog: medium intensity sliding scale (see below)    Meal time sliding scale insulin:  If -199 give 1 unit  If -249 give 2 units  If -299 give 3 units  If -349 give 4 units  If BG greater than 350 give 5 units    Bedtime sliding scale insulin:   If -249 give 1 unit  If -299 give 2 units  If -349 give 3 units  If BG greater than 350 give 4 units    Recent Labs   Lab 22  0900 22  0734 22  0702 22  0611 22  0506 22  0426   * 77 51* 152* 240* 235*       Current nutritional intake and type: Orders Placed This Encounter      Combination Diet Regular Diet Adult    Planned Procedures/surgeries: none  Steroid planning: none  D5W-containing solutions/medications: none    PTA Diabetes Regimen:   Pt reports last glargine 4 units in afternoon, maybe on 7/15/22  Humalog dosing unclear  **waiting on Freestyle Avelino supplies    Discharge Planning: TBD, possible TCU           Diabetes History:   Type of " Diabetes: Type 1 Diabetes Mellitus, with retinopathy, with neuropathy  Lab Results   Component Value Date    A1C 10.0 07/18/2022              Review of Systems:     Pertinent systems were negative except as noted in history.          Medications:     Current Facility-Administered Medications   Medication     acetaminophen (TYLENOL) tablet 325 mg     aspirin (ASA) chewable tablet 81 mg     benzonatate (TESSALON) capsule 100 mg     dextrose 10% infusion     dextrose 5% and 0.9% NaCl infusion     glucose gel 15-30 g    Or     dextrose 50 % injection 25-50 mL    Or     glucagon injection 1 mg     glucose gel 15-30 g    Or     dextrose 50 % injection 25-50 mL    Or     glucagon injection 1 mg     escitalopram (LEXAPRO) tablet 10 mg     insulin 1 unit/mL in saline (NovoLIN, HumuLIN Regular) drip - ADULT IV Infusion     [START ON 8/2/2022] insulin aspart (NovoLOG) injection (RAPID ACTING)     insulin aspart (NovoLOG) injection (RAPID ACTING)     insulin aspart (NovoLOG) injection (RAPID ACTING)     insulin aspart (NovoLOG) injection (RAPID ACTING)     ipratropium - albuterol 0.5 mg/2.5 mg/3 mL (DUONEB) neb solution 3 mL     lidocaine (LMX4) cream     lidocaine 1 % 0.1-1 mL     melatonin tablet 1 mg     metoprolol succinate ER (TOPROL XL) 24 hr tablet 25 mg     ondansetron (ZOFRAN ODT) ODT tab 4 mg    Or     ondansetron (ZOFRAN) injection 4 mg     polyethylene glycol (MIRALAX) Packet 17 g     QUEtiapine (SEROquel) quarter-tab 6.25 mg     sodium chloride (PF) 0.9% PF flush 3 mL            Physical Exam:    /57 (BP Location: Left arm)   Pulse 81   Temp 98.3  F (36.8  C) (Oral)   Resp 16   Ht 1.524 m (5')   Wt 50.7 kg (111 lb 12.4 oz)   SpO2 94%   BMI 21.83 kg/m      General: pleasant, in no distress. thin woman. Resting in bed. Attendant at bedside  HEENT: normocephalic, atraumatic. Oral mucous membranes moist.   Lungs: unlabored respiration, no cough  ABD: rounded  Skin:  dry, no obvious lesions  MSK:  moves all  extremities  Mental status:  alert, poor memory but does recognize me from prior  Psych:   calm and appropriate interaction               Data:     Recent Labs   Lab 08/01/22  0900 08/01/22  0734 08/01/22  0702 08/01/22  0611 08/01/22  0506 08/01/22  0426   * 77 51* 152* 240* 235*     Lab Results   Component Value Date    WBC 7.0 07/26/2022    WBC 7.5 07/25/2022    WBC 7.3 07/24/2022    HGB 8.4 (L) 07/26/2022    HGB 8.6 (L) 07/25/2022    HGB 8.7 (L) 07/24/2022    HCT 26.1 (L) 07/26/2022    HCT 27.5 (L) 07/25/2022    HCT 27.1 (L) 07/24/2022    MCV 96 07/26/2022    MCV 96 07/25/2022    MCV 95 07/24/2022     07/30/2022     07/27/2022     07/26/2022     Lab Results   Component Value Date     07/31/2022     07/26/2022     07/25/2022    POTASSIUM 4.5 07/31/2022    POTASSIUM 4.6 07/26/2022    POTASSIUM 4.6 07/25/2022    CHLORIDE 98 07/31/2022    CHLORIDE 108 07/26/2022    CHLORIDE 107 07/25/2022    CO2 28 07/31/2022    CO2 26 07/26/2022    CO2 29 07/25/2022     (H) 08/01/2022    GLC 77 08/01/2022    GLC 51 (L) 08/01/2022     Lab Results   Component Value Date    BUN 30 07/31/2022    BUN 21 07/26/2022    BUN 23 07/25/2022     No results found for: TSH  No results found for: AST, ALT, GGT, ALKPHOS    I spent a total of 40 minutes bedside and on the inpatient unit managing the glycemic care of Yanira Keene. Over 50% of my time on the unit was spent counseling the patient  and/or coordinating care regarding acute glycemic management and/or discharge planning.  See note for details.      To contact Endocrine Diabetes service:   From 8AM-4PM: page inpatient diabetes provider that is following the patient  For questions or updates from 4PM-8AM: page the diabetes job code for on call fellow: [phone]

## 2022-08-01 NOTE — PROGRESS NOTES
Social Work Progress Note    Social work reached out to patients daughter, Niya, who reported she did file an appeal for [facility] auth. It is believed that PT/OT are still rec'ing TCU for patient, however, [facility] has not approved authorization for this level of care. Patients daughter asked if it would be possible to get care in the home instead or if this would not be safe for patient. SW told daughter that I would need to follow up with PT/OT to see if that's an option. SW will reach out to the team tomorrow to see if recs have changed at all.     Patients daughter also noted that it would be ideal if patient could discharge before Thursday as she has an appt with an Endocrinologist at 10:15am and they have been waiting for a while to get patient an appt. Social work will follow up with PT/OT tomorrow to see if they believe its safe for patient to go home.     Social work will continue to follow and provide assistance to ensure a safe and timely discharge.     Shannon Cordoba, MYLENE, LGSW  8A and 10 ICU   [facility]   Phone: [phone]

## 2022-08-01 NOTE — PLAN OF CARE
Neuro: A&O x1-4, with confusion resolving throughout the night. PERRLA, denies pain/discomfort. Sleep: approximately 6 hrs.  C/V: Afebrile. BP elevated initially with frequent ambulation/urination which improved while resting. HR WNL. No apparent edema. New R PIV placed at shift change.   Pulm: Problem: Respiratory Compromise (Pneumonia)  Goal: Effective Oxygenation and Ventilation  Outcome: Ongoing, Progressing with O2 Saturations via continuous pulse oxymetry >92% on room air. Denies shortness of breath/air hunger. Lungs clear to auscultation.  GI: Problem: Hyperglycemia  Goal: Blood Glucose Level Within Targeted Range  Outcome: Ongoing, Progressing   Goal Outcome Evaluation: placed on insulin gtt for elevated lab glucose reading of 553; initiated 7/31 @ 2155.  8/1@ 0102: infusion stopped for BG of 41 and 35. Patient remained appropriate and responsive during this time, tolerating Ensure and 25ml D50%.   0428: Insulin infusion resumed with .   0707: Insulin gtt off. BG 51 25ml D50 given + 100ml Ensure. JAGJIT cross-cover notified of all glucose exceptions and interventions.    Problem: Malnutrition  Goal: Improved Nutritional IntakeOutcome: Ongoing, Progressing tolerating 60% (65 CHO) dinner and 1 Ensure, overnight. Verified with provider, patient will remain on current diet orders until further notice.   : Adequate UOP; frequent need to void. Purewick external catheter placed to facilitate rest and occasional incontinence.   MS: SERVANDO's, ambulation with FWW and contact-guard assist to BR.     Deanna Christensen RN

## 2022-08-02 NOTE — PROGRESS NOTES
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 IDS signed off 7/25.  Re-called 8/1.      Assessment:   1)Type 1 Diabetes Mellitus, uncontrolled (A1c 10.0)  2) cognitive concerns      Multiple problems contributing to BG lability: premature discontinuation insulin drip, late degludec administration, stacking of aspart correction    Plan: permissive hyperglycemia today-- aim for 200s    -Tresiba U100 8 units daily Q24h 2000--> reduce to 5 units for tonight         -Novolog 1:15g CHO coverage with breakfast, lunch and snacks/supplements, and  1 unit per 25 grams for supper coverage--> total dose given to be charted, along with the carbohydrate consumption   -Novolog medium intensity sliding scale TID AC and HS and 0200 .  --> threshhold for HS and 0200 changed to 250   -BG monitoring TID AC, HS, 0200   -hypoglycemia protocol   -PRN D5NS at 75 ml/h   -carb counting protocol   -will discharge to TCU, no DM education needs   -unsafe for patient to independently manage /administer her own  insulin.  She would benefit from carb counted Novolog                      -follow up with [facility] as scheduled on 8/4/22--> will need to be rescheduled if discharge delayed beyond appt date/time     -upon discharge, patient will need the following supplies: none needed if discharging to TCU - [facility]?? Delivered yet? Message left with son Rell 8/2--> sensors arrived and he will bring them next time he comes (maybe Wednesday)    Discussed w/ pt, RN, and primary  "team        Interval History and Assessment:   When last seen on 7/25-->      More recently-->      Low BG followed increase in glargine and possibly correction of a post-prandial peak glucose.  Then hyperglycemic and up to 400s needing IV insulin start Sunday evening  Note that 1230 intake Sunday and coverage was \"rounded down\" --details of carb intake are not available.  Meal ticket shows 42 grams but unknown what portion Rossana consumed.  She always has a salad, but her recall of the pasta intake is poor.  Also, no BG between 0800 and 1630 .  Hypoglycemia on alg 2, so revised order to maintain alg 1.    Titration on algorithm 2 again lead to hypoglycemia and regrettably there was no restart of IV insulin per protocol      Then Aspart correction doses x 2 within 3 hours    No notes overnight stating pt symptomatic w/ hypoglycemia or not. Usually she is asymptomatic.  Hypo again on labs and before bfast.  52 grams carb in meal, covered for only 22 grams (1 unit) -- subtracting 30 grams as hypo treatment      Recent Labs   Lab 08/02/22  0311 08/02/22  0230 08/02/22  0207 08/01/22  2220 08/01/22  1925 08/01/22  1748   * 79 29* 256* 248* 379*       Current nutritional intake and type: Orders Placed This Encounter      Combination Diet Regular Diet Adult    Planned Procedures/surgeries: none  Steroid planning: none  D5W-containing solutions/medications: none    PTA Diabetes Regimen:   Pt reports last glargine 4 units in afternoon, maybe on 7/15/22  Humalog dosing unclear  **waiting on Freestyle Avelino supplies    Discharge Planning: TBD, possible TCU?           Diabetes History:   Type of Diabetes: Type 1 Diabetes Mellitus, with retinopathy, with neuropathy  Lab Results   Component Value Date    A1C 10.0 07/18/2022              Review of Systems:     Pertinent systems were negative except as noted in history.          Medications:     Current Facility-Administered Medications   Medication     acetaminophen " (TYLENOL) tablet 325 mg     aspirin (ASA) chewable tablet 81 mg     benzonatate (TESSALON) capsule 100 mg     dextrose 10% infusion     dextrose 5% and 0.9% NaCl infusion     glucose gel 15-30 g    Or     dextrose 50 % injection 25-50 mL    Or     glucagon injection 1 mg     escitalopram (LEXAPRO) tablet 10 mg     insulin 1 unit/mL in saline (NovoLIN, HumuLIN Regular) drip - ADULT IV Infusion     insulin aspart (NovoLOG) injection (RAPID ACTING)     insulin aspart (NovoLOG) injection (RAPID ACTING)     insulin aspart (NovoLOG) injection (RAPID ACTING)     insulin aspart (NovoLOG) injection (RAPID ACTING)     insulin aspart (NovoLOG) injection (RAPID ACTING)     insulin aspart (NovoLOG) injection (RAPID ACTING)     insulin degludec (TRESIBA) 100 UNIT/ML injection 8 Units     ipratropium - albuterol 0.5 mg/2.5 mg/3 mL (DUONEB) neb solution 3 mL     lidocaine (LMX4) cream     lidocaine 1 % 0.1-1 mL     Med Instruction - Transition from IV Insulin Infusion to Sub-Q Insulin     melatonin tablet 1 mg     metoprolol succinate ER (TOPROL XL) 24 hr tablet 25 mg     ondansetron (ZOFRAN ODT) ODT tab 4 mg    Or     ondansetron (ZOFRAN) injection 4 mg     polyethylene glycol (MIRALAX) Packet 17 g     QUEtiapine (SEROquel) quarter-tab 6.25 mg     sodium chloride (PF) 0.9% PF flush 3 mL            Physical Exam:    BP (!) 145/77 (BP Location: Left arm, Cuff Size: Adult Regular)   Pulse 86   Temp 98  F (36.7  C) (Oral)   Resp 17   Ht 1.524 m (5')   Wt 50.7 kg (111 lb 12.4 oz)   SpO2 97%   BMI 21.83 kg/m      General: pleasant, in no distress. thin woman. Resting in bed  HEENT: normocephalic, atraumatic. Oral mucous membranes moist.   Lungs: unlabored respiration, no cough  ABD: rounded  Skin:  dry, no obvious lesions  MSK:  moves all extremities  Mental status:  alert, poor memory   Psych:   calm and appropriate interaction               Data:     Recent Labs   Lab 08/02/22  0311 08/02/22  0230 08/02/22  0207 08/01/22  8660  08/01/22  1925 08/01/22  1748   * 79 29* 256* 248* 379*     Lab Results   Component Value Date    WBC 7.0 07/26/2022    WBC 7.5 07/25/2022    WBC 7.3 07/24/2022    HGB 8.4 (L) 07/26/2022    HGB 8.6 (L) 07/25/2022    HGB 8.7 (L) 07/24/2022    HCT 26.1 (L) 07/26/2022    HCT 27.5 (L) 07/25/2022    HCT 27.1 (L) 07/24/2022    MCV 96 07/26/2022    MCV 96 07/25/2022    MCV 95 07/24/2022     07/30/2022     07/27/2022     07/26/2022     Lab Results   Component Value Date     07/31/2022     07/26/2022     07/25/2022    POTASSIUM 4.5 07/31/2022    POTASSIUM 4.6 07/26/2022    POTASSIUM 4.6 07/25/2022    CHLORIDE 98 07/31/2022    CHLORIDE 108 07/26/2022    CHLORIDE 107 07/25/2022    CO2 28 07/31/2022    CO2 26 07/26/2022    CO2 29 07/25/2022     (H) 08/02/2022    GLC 79 08/02/2022    GLC 29 (LL) 08/02/2022     Lab Results   Component Value Date    BUN 30 07/31/2022    BUN 21 07/26/2022    BUN 23 07/25/2022     No results found for: TSH  No results found for: AST, ALT, GGT, ALKPHOS    I spent a total of 40 minutes bedside and on the inpatient unit managing the glycemic care of Yanira Keene. Over 50% of my time on the unit was spent counseling the patient  and/or coordinating care regarding acute glycemic management and/or discharge planning.  See note for details.      To contact Endocrine Diabetes service:   From 8AM-4PM: page inpatient diabetes provider that is following the patient  For questions or updates from 4PM-8AM: page the diabetes job code for on call fellow: [phone]

## 2022-08-02 NOTE — PLAN OF CARE
Pt alert but disoriented to place. A of 1 w/ walker. No complaints of pain. Cont/incont of bladder. BG was 56 in AM, Endo notified. Pt requested apple juice and BG went up to 74 and then 117. Pt not diaphoretic or nauseous during hypoglycemic episode. Lunch and dinner BGs were 314 and 295. Coverage given. Bed alarm on.    Pt transferred to  via wheelchair w/ personal belongings around 1850.

## 2022-08-02 NOTE — PROGRESS NOTES
Social Work Progress Note    Social work spoke with PT today to see if home care would be an option for patient as [facility] declined TCU placement. PT is stating that it would not be safe for patient to live alone at this time. They reported that she walks fine with a walker but it wouldn't be appropriate for patient to live independently in her current state. Ultimately, PT states patient has hit a plateau and they do not anticipate she will improve from where she is currently at. Patients cognition appears to be quite low which also contributes to concerns of living independently. PT is suggesting that if patient is to have someone with her 24/7, she would be appropriate to return home. If not, a Memory Care unit would be the next best option. Social work has reached out to patients daughter via phone to have a conversation about this, however, no answer. Left voicemail for daughter, Niya, to call back.    Social work will continue to follow and provide assistance to ensure a safe and timely discharge.     MYLENE Cabral, LGSW  8A and 10 ICU   [facility]   Phone: [phone]

## 2022-08-02 NOTE — PROGRESS NOTES
"[facility], [place]   Internal Medicine Daily Note           Interval History/Events     Hand off taken  Reports doing well  No nausea, vomiting  No chest pain, shortness of breath  No fever, chills.            Review of Systems        4 point ROS including Respiratory, CV, GI and , other than that noted above is negative      Medications   I have reviewed current medications  in the \"current medication\" section of Epic.  Relevant changes include:     Physical Exam   General:       Vital signs:    Blood pressure (!) 142/70, pulse 83, temperature 98  F (36.7  C), temperature source Oral, resp. rate 18, height 1.524 m (5'), weight 50.7 kg (111 lb 12.4 oz), SpO2 98 %.  Estimated body mass index is 21.83 kg/m  as calculated from the following:    Height as of this encounter: 1.524 m (5').    Weight as of this encounter: 50.7 kg (111 lb 12.4 oz).    No intake or output data in the 24 hours ending 08/02/22 1625     Constitutional: Laying in bed in no acute disterss  Eye: No icterus, no pallor  Mouth/ENT: Normal oral mucosa  Cardiovascular: S1, S2 normal.   Respiratory: B/L CTA  GI: Soft, NT, BS+  :   Neurology:   Psych:   MSK:   Integumentary:   Heme/Lymph/Imm:      Laboratory and Imaging Studies     I have reviewed  laboratory and imaging studies in the Epic. Pertinent findings are as below:    BMP  Recent Labs   Lab 08/02/22  1328 08/02/22  1311 08/02/22  1024 08/02/22  1002 08/02/22  0929 08/02/22  0827 07/31/22  2148 07/31/22  1857 07/30/22  0829 07/30/22  0536 07/27/22  0906 07/27/22  0543   NA  --   --   --   --   --  139  --  133  --   --   --   --    POTASSIUM  --   --   --   --   --  3.9  --  4.5  --   --   --   --    CHLORIDE  --   --   --   --   --  108  --  98  --   --   --   --    THERON  --   --   --   --   --  8.7  --  8.8  --   --   --   --    CO2  --   --   --   --   --  28  --  28  --   --   --   --    BUN  --   --   --   --   --  34*  --  30  --   --   --   --    CR  --   " --   --   --   --  0.66  --  0.68  --  0.78  --  0.78   * 280* 117* 74   < > 42*   < > 553*   < >  --    < >  --     < > = values in this interval not displayed.     CBC  Recent Labs   Lab 07/30/22  0536 07/27/22  0543    341     INRNo lab results found in last 7 days.  LFTsNo lab results found in last 7 days.   PANCNo lab results found in last 7 days.        Impression/Plan          77 year old female with a history of Cognitive disorder, CAD, DM1 c/b retinopathy and neuropathy, MDD admitted on 7/17/2022. She presents for cough and high blood sugar found to have pneumonia and hyperglycemia from not taking her insulin. Given her cognitive disorder and concerns for ability to care for herself, it was recommended she be admitted for treatment of pneumonia and optimization of blood sugar with PT/OT evaluations.     # Hospital Acquired Pneumonia:   - COVID test here negative, at home RAT was positive >1 week ago (when daughter was still in town, but patient is unsure of exact date).   Required 1-2 liters intermittently on 07/19, on RA on 07/20  On Room air   - Completed a 5 day treatment of Cefepime         # DM1 complicated by neuropathy and retinopathy:   Patient was stabilized on a regimen, but this had to be revisited again due to significantly fluctuating blood sugars  Discussed with Endocrinology on 08/02.   - Appreciate management.      # MDD  - continue lexapro     # CAD s/p stent to LAD  PTA on Aspirin, and Metoprolol XL.   - Continue       # Cognitive impairment  # Probable Toxic, Metabolic infection d/t  infection, metabolic abnormality  - concerns for ability to care for herself given complexity of DM1 and forgetfulness  - PT /OT consulted  -  Low dose seroquel added ( 65.25 mg at bed time)   - Delirium prevention measures  - Treat infection, hyperglycemia   - Currently feeling much better. Therapy inclined to advice TCU         # Euvolemic Hyponatremia, Resolved   - likely SIADH in the setting  of pneumonia  Na level normal on 07/19/2022    # Probable FELIPE on admission likely pre renal  Creatinine 1.25 on admit, which improved to 0.7 on 07/19  Renal function has remained stable since       # Malnutrition  - nutrition consult     # Anemia of chronic disease  Hg 8.4 gm/dl on 07/26/2022             Diet: Combination Diet Regular Diet Adult    DVT Prophylaxis: Enoxaparin (Lovenox) SQ  Billy Catheter: Not present  Central Lines: None  Cardiac Monitoring: None  Code Status: No CPR- Do NOT Intubate              Pt's care was discussed with bedside RN, patient and  during Care Team Rounds.               Terrence Daley MD  Hospitalist ( Internal medicine)  Pager: [phone]

## 2022-08-03 NOTE — PLAN OF CARE
Pt forgetful, oriented to self. VSS on RA, LS clear. Assist x1 to BR with w/gb. Intermittent incontinence of bladder, brief on. Bed alarm on, calls appropriately. 0200 , no insulin given. 0400 . 0600 .

## 2022-08-03 NOTE — PROGRESS NOTES
CLINICAL NUTRITION SERVICES - REASSESSMENT NOTE     Nutrition Prescription    RECOMMENDATIONS FOR MDs/PROVIDERS TO ORDER:  None today     Malnutrition Status:    Moderate malnutrition in the context of acute on chronic illness or injury     Recommendations already ordered by Registered Dietitian (RD):  Ensure Enlive PRN only     Future/Additional Recommendations:  Continue to monitor meal intakes, weight/lab trends      EVALUATION OF THE PROGRESS TOWARD GOALS   Diet: Regular  Supplement: Pt previously on Ensure at 2 , [employer] at HS but order was discontinued by prior RD as pt had excess stock in room  Intake: % per flow sheets        NEW FINDINGS   MAR reviewed. Labs reviewed. RD visited pt at bedside, pt reports has good appetite and taking meals, reviewed prior supplement orders noting no stock in current room unless was in bags, pt prefers not to have supplement ordered but agreeable to PRN order and asking for supplement as needed. RD reviewed the importance of adequate nutritional intakes for improved health status, reviewed weight trends below.     08/03/22 1032 49.9 kg (109 lb 14.4 oz) Bed scale   07/27/22 0300 50.7 kg (111 lb 12.4 oz) Bed scale   07/20/22 0020 50.2 kg (110 lb 10.7 oz) Bed scale   07/18/22 1545 51.1 kg (112 lb 10.5 oz) Bed scale     07/11/22 51.3 kg (113 lb)   09/11/21 51.3 kg (113 lb)     Weight assessment: Significant 1.8% weight loss in 1 week vs weighing accuracy, non-significant 3.5% weight loss in about 1 month and almost 1 year. Pt reports a UBW of 113 lbs, doesn't feel like has had weight loss when reviewed above trends, noted bed scale may be off.     MALNUTRITION  % Intake: No decreased intake noted  % Weight Loss: Possibly 1-2% in 1 week (moderate)  Subcutaneous Fat Loss: globally mild vs age related   Muscle Loss: globally mild vs age related   Fluid Accumulation/Edema: None noted  Malnutrition Diagnosis: Moderate malnutrition in the context of acute on chronic illness or  injury     Previous Goals   Patient to consume % of nutritionally adequate meal trays TID, or the equivalent with supplements/snacks  Evaluation: Met    Previous Nutrition Diagnosis  Inadequate oral intake related to previous poor/inconsistent appetite/intake as evidenced by evident muscle and fat wasting  Evaluation: Improving, diagnosis changed to below     CURRENT NUTRITION DIAGNOSIS  Predicted inadequate nutrient intake related to menu fatigue/LOS    INTERVENTIONS  Implementation  Medical food supplement therapy - PRN order only per pt request     Goals  Patient to consume % of nutritionally adequate meal trays TID, or the equivalent with supplements/snacks.    Monitoring/Evaluation  Progress toward goals will be monitored and evaluated per protocol.    Nichole Mccollum RD, CNSC, LD  8A RD pager: [phone]

## 2022-08-03 NOTE — CARE PLAN
[id number]    /56 (BP Location: Left arm)   Pulse 91   Temp 99.7  F (37.6  C) (Oral)   Resp 17   Ht 1.524 m (5')   Wt 50.7 kg (111 lb 12.4 oz)   SpO2 96%   BMI 21.83 kg/m  .     Pt arrive on 8A at 1900.  Alert. Disoriented to situation. Orientation fluctuated throughout shift, pt tends to re-ask questions.  VSS.  Baseline, intermittent N/T all extremities.  Denies N/V.  LS clear. Denies SOB. Denies chest pain. Currently on RA. No cough osberved.  Last BM on 8/2. Voids without difficulty - some urgency and incontinence noted.  Assist x 1 w/ walker and GB.  Skin intact ex misc bruising from labs draws/IVs.  R PIV - patent, saline locked.  Denies pain.  Combination/Regular diet.  BG checks and insulin before meals and at bedtime.    Continue POC.

## 2022-08-03 NOTE — PLAN OF CARE
"PT: Of note during session multiple times patient stating that she feels her daughter is \"orchestrating all of this from [place]\" in regards to not being able to return home. Spent time discussing limitations in returning home but did not appear to understanding. Insistent that her cat will be able to assist her at home.                       "

## 2022-08-03 NOTE — PLAN OF CARE
Pt disoriented to situation and place, pleasant and cooperative, bed alarm on. Pt calling appropriately, able to make needs know. Pt denies pain. Up Ax1 w/walker.     Pt reports baseline n/t in hands and feet.     Skin intact.    Pt intermittently incontinent of urine. LBM 8/3.    Son  Brought home device for glucose monitoring, applied to L arm. Pt's son forgot to bring tracker, to be brought within the next couple days.    Discharge to TCU pending medical stability.

## 2022-08-03 NOTE — PROGRESS NOTES
"Social Work Progress Note    Social work spoke with patients daughter, Niya, to let her know what PT is recommending. SW explained that PT does not believe its safe that her mom would return home independently. Niya was understanding, however, she seems to prefer that her mom return home and have home care come in a couple times a week as well as patients son on the other days. Niya is wondering if this could be a temporary solution for now until she's able to return home from [place] and help her mom get set up in more of a LTC or memory care setting. Social work told daughter I would touch base with the team and get back to her this afternoon.    Per PT team and case notes, it does not seem appropriate that patient would return home without 24/7 services. There appears to be a decline in cognition, therefore, it would be beneficial for psych to weight in on patients decisional status. Paged hospitalist to have them put in a psych consult to determine whether patient is decisional or not. SW updated that SLUMs score was 9/30 on 7/21 and 15/30 on 7/25, suggesting that patient is struggling with dementia.     Per PT, patient is physically a \"standby\" but they would not trust patient alone. While using the bathroom today, patient was \"very unsafe\" and there were concerns she could potentially fall off the toilet if she was alone. Furthermore, although patient is not normally incontinent, patient did urinate on the bed and on her clothes today. She did not acknowledge needing to use the bathroom until PT suggested that patient gets up and moves around a bit.     Patients daughter, Niya, expressed concern that her mother make it to her OP endocrinology appt tomorrow as they have been waiting a while for her to get to this appointment. Social work told daughter that its not likely that she will make it to this appt as we currently do not have a safe discharge plan. Endocrinology did she patient in " the hospital today, therefore, it appears her diabetes is being monitored.     Addendum 12:25 PM:  Social work informed that [facility] has overturned their denial for TCU. Patient is now has auth to go to TCU. [facility] states patient can admit to [facility] tomorrow. SW will follow up with family and let them know as well as set up a ride. Clari states the best time to arrive tomorrow would be between 11:30am-1pm.     Addendum 3:32 PM:  Patient will discharge to [facility] at 9am tomorrow morning. She has an appt with her endocrinologist at 1015am which family will take her to and then they will take her to the TCU facility. Paged hospitalist to let them know. Informed [facility].     [facility] ([facility])  P: [phone]   F: [phone]    Social work will continue to follow and provide assistance to ensure a safe and timely discharge.     Shannon Cordoba, MYLENE, LGSW  8A and 10 ICU   [facility]   Phone: [phone]

## 2022-08-03 NOTE — PROGRESS NOTES
Care Coordinator Progress Note    Admission Date/Time:  7/17/2022  Attending MD:  Sterling Hanson MD    [facility] - [id number]       Becca COLINN RN CCM  RN Care Coordinator 8A and 10 ICU  [address] [address]  [email or web address]   Office: (10 ICU) [phone]   Pager: [phone]    For Weekend & Holiday on call RN Care Coordinator:  (Tasks: Home care, home infusion, medical equipment/oxygen, transportation, IMM & RICHARDSON forms, etc.)   [place] & [facility] ([id number]) Saturday & Sunday; ([phone])  Recognized Holidays  Pager #1: [phone] Units: 4A, 4C, 4E, 5A & 5B   Pager #2: [phone] Units: 6A, 6B, 6C, 6D  Pager #3: [phone] Units: 7A, 7B, 7C, 7D & 5C   Pager #4: [phone] Units: 5 Ortho, 8A, 10 ICU, & [facility]      For Weekend & Holiday on call Social Work:  (Tasks: TCU, transportation, Hospice, adjustment to illness counseling, Health Care Directives, Child Protection and Domestic Violence concerns, Vulnerable Adult, IMM forms, etc.)   [place] (0800 - 1630) Saturday and Sunday  Pager: [phone] Units: 4A, 4C, 4E, 5A and 5B   Pager: [phone] Units: 6A, 6B, 6C, 6D   Pager: [phone]: 7A, 7B, 7C, 7D, and 5C      [facility] (0106-3037) Saturday and Sunday  Units: 5 Ortho, 8A, and 10 ICU   Pager: [phone]

## 2022-08-03 NOTE — PROGRESS NOTES
"[facility]   Internal Medicine Daily Note           Interval History/Events     Overnight events reviewed  Reports doing well  No nausea, vomiting, chest pain, shortness of breath  No fever, chills.          Review of Systems        4 point ROS including Respiratory, CV, GI and , other than that noted above is negative      Medications   I have reviewed current medications  in the \"current medication\" section of Epic.  Relevant changes include:     Physical Exam   General:       Vital signs:    Blood pressure 120/61, pulse 80, temperature 98.6  F (37  C), temperature source Oral, resp. rate 15, height 1.524 m (5'), weight 49.9 kg (109 lb 14.4 oz), SpO2 94 %.  Estimated body mass index is 21.46 kg/m  as calculated from the following:    Height as of this encounter: 1.524 m (5').    Weight as of this encounter: 49.9 kg (109 lb 14.4 oz).    No intake or output data in the 24 hours ending 08/02/22 1625     Constitutional: Laying in bed in no acute disterss  Eye: No icterus, no pallor  Mouth/ENT: Normal oral mucosa  Cardiovascular: S1, S2 normal.   Respiratory: B/L CTA  GI: Soft, NT, BS+  :   Neurology:   Psych:   MSK:   Integumentary:   Heme/Lymph/Imm:      Laboratory and Imaging Studies     I have reviewed  laboratory and imaging studies in the Epic. Pertinent findings are as below:    BMP  Recent Labs   Lab 08/03/22  1134 08/03/22  0846 08/03/22  0632 08/03/22  0442 08/02/22  0929 08/02/22  0827 07/31/22  2148 07/31/22  1857 07/30/22  0829 07/30/22  0536   NA  --   --   --   --   --  139  --  133  --   --    POTASSIUM  --   --   --   --   --  3.9  --  4.5  --   --    CHLORIDE  --   --   --   --   --  108  --  98  --   --    THERON  --   --   --   --   --  8.7  --  8.8  --   --    CO2  --   --   --   --   --  28  --  28  --   --    BUN  --   --   --   --   --  34*  --  30  --   --    CR  --   --   --   --   --  0.66  --  0.68  --  0.78   * 149* 124* 121*   < > 42*   < > " 553*   < >  --     < > = values in this interval not displayed.     CBC  Recent Labs   Lab 07/30/22  0536        INRNo lab results found in last 7 days.  LFTsNo lab results found in last 7 days.   PANCNo lab results found in last 7 days.        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 She presents for cough and high blood sugar found to have pneumonia and hyperglycemia from not taking her insulin. Given her cognitive disorder and concerns for ability to care for herself, it was recommended she be admitted for treatment of pneumonia and optimization of blood sugar with PT/OT evaluations.     # Hospital Acquired Pneumonia:   - COVID test here negative, at home RAT was positive >1 week ago (when daughter was still in town, but patient is unsure of exact date).   Required 1-2 liters intermittently on 07/19, on RA on 07/20  On Room air   - Completed a 5 day treatment of Cefepime         # DM1 complicated by neuropathy and retinopathy:   Patient was stabilized on a regimen, but this had to be revisited again due to significantly fluctuating blood sugars  Discussed with Endocrinology on 08/02.   - Appreciate management.   - Recommendation from 08/03 reviewed     # MDD  - continue lexapro     # CAD s/p stent to LAD  PTA on Aspirin, and Metoprolol XL.   - Continue       # Cognitive impairment  # Probable Toxic, Metabolic infection d/t  infection, metabolic abnormality  - concerns for ability to care for herself given complexity of DM1 and forgetfulness  - PT /OT consulted  -  Low dose seroquel added ( 65.25 mg at bed time)   - Delirium prevention measures  - Treat infection, hyperglycemia   - Currently feeling much better. Therapy inclined to advice TCU         # Euvolemic Hyponatremia, Resolved   - likely SIADH in the setting of pneumonia  Na level normal on 07/19/2022    # Probable FELIPE on admission likely pre  renal  Creatinine 1.25 on admit, which improved to 0.7 on 07/19  Renal function has remained stable since       # Malnutrition  - nutrition consult     # Anemia of chronic disease  Hg 8.4 gm/dl on 07/26/2022             Diet: Combination Diet Regular Diet Adult    DVT Prophylaxis: Enoxaparin (Lovenox) SQ  Billy Catheter: Not present  Central Lines: None  Cardiac Monitoring: None  Code Status: No CPR- Do NOT Intubate              Pt's care was discussed with bedside RN, patient and  during Care Team Rounds.               Terrence Daley MD  Hospitalist ( Internal medicine)  Pager: [phone]

## 2022-08-03 NOTE — PROGRESS NOTES
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 IDS signed off 7/25.  Re-called 8/1.      Assessment:   1)Type 1 Diabetes Mellitus, uncontrolled (A1c 10.0)  2) cognitive concerns        Plan:    -if she is ever on IV insulin, initial plan should be to titrate only within algorithm 1    -Tresiba U100 5 units daily Q24h 2000   -Novolog 1:15g CHO coverage with breakfast, lunch and snacks/supplements, and  1 unit per 25 grams for supper coverage-->  increase to 1 per 13 grams bfast and increase to 1 per 20 grams supper.   -Novolog medium intensity sliding scale TID AC   --> threshhold for HS and 0200 is 250   -BG monitoring TID AC, HS, 0200   -hypoglycemia protocol   -PRN D5NS at 75 ml/h   -carb counting protocol   -will discharge to TCU, no DM education needs   -unsafe for patient to independently manage /administer her own  insulin.  She would benefit from carb counted Novolog.  She needs insulin EACH time she eats, so estimate in need of supervision at least three times per day.  Unknown if she could manage hypoglycemia on her own.                     -follow up with [facility] as scheduled on 8/4/22--> will need to be rescheduled if discharge delayed beyond appt date/time     -upon discharge, patient will need the following supplies: none needed if discharging to TCU - CGMS??   - son Rell 8/2--> sensors arrived and he will bring them next time he comes (maybe today)    Discussed w/ pt, RN, and primary team messaged        Interval History and Assessment:    BG improving.  Rossana is mostly able to discuss past hx.  Maybe remembered the new basal insulin.  Wishes her son could bring her cat for a visit.  Notes indicate she needs 24/7 care.  And pended SW note suggests pt's dghtr wants temporary plan for less than that.        Recent Labs   Lab 08/03/22  0846 08/03/22  0632 08/03/22  0442 08/03/22  0203 08/02/22  2238 08/02/22  1737   * 124* 121* 140* 305* 295*       Current nutritional intake and type: Orders Placed This Encounter      Combination Diet Regular Diet Adult    Planned Procedures/surgeries: none  Steroid planning: none  D5W-containing solutions/medications: none    PTA Diabetes Regimen:   Pt reports last glargine 4 units in afternoon, maybe on 7/15/22  Humalog dosing unclear  **waiting on [employer] supplies--< they arrived    Discharge Planning: TBD, possible TCU?           Diabetes History:   Type of Diabetes: Type 1 Diabetes Mellitus, with retinopathy, with neuropathy  Lab Results   Component Value Date    A1C 10.0 07/18/2022              Review of Systems:     Pertinent systems were negative except as noted in history.          Medications:     Current Facility-Administered Medications   Medication     acetaminophen (TYLENOL) tablet 325 mg     aspirin (ASA) chewable tablet 81 mg     benzonatate (TESSALON) capsule 100 mg     dextrose 10% infusion     dextrose 5% and 0.9% NaCl infusion     glucose gel 15-30 g    Or     dextrose 50 % injection 25-50 mL    Or     glucagon injection 1 mg     escitalopram (LEXAPRO) tablet 10 mg     insulin aspart (NovoLOG) injection (RAPID ACTING)     insulin aspart (NovoLOG) injection (RAPID ACTING)     insulin aspart (NovoLOG) injection (RAPID ACTING)     insulin aspart (NovoLOG) injection (RAPID ACTING)     insulin aspart (NovoLOG) injection (RAPID ACTING)     insulin aspart (NovoLOG) injection (RAPID ACTING)     insulin degludec (TRESIBA) 100 UNIT/ML injection 5 Units     ipratropium - albuterol 0.5 mg/2.5  mg/3 mL (DUONEB) neb solution 3 mL     lidocaine (LMX4) cream     lidocaine 1 % 0.1-1 mL     Med Instruction - Transition from IV Insulin Infusion to Sub-Q Insulin     melatonin tablet 1 mg     metoprolol succinate ER (TOPROL XL) 24 hr tablet 25 mg     ondansetron (ZOFRAN ODT) ODT tab 4 mg    Or     ondansetron (ZOFRAN) injection 4 mg     polyethylene glycol (MIRALAX) Packet 17 g     QUEtiapine (SEROquel) quarter-tab 6.25 mg     sodium chloride (PF) 0.9% PF flush 3 mL            Physical Exam:    /61 (BP Location: Left arm)   Pulse 80   Temp 98.6  F (37  C) (Oral)   Resp 15   Ht 1.524 m (5')   Wt 49.9 kg (109 lb 14.4 oz)   SpO2 94%   BMI 21.46 kg/m      General: pleasant, in no distress. thin woman. Resting in bed  HEENT: normocephalic, atraumatic. Oral mucous membranes moist.   Lungs: unlabored respiration, no cough  ABD: rounded  Skin:  dry, no obvious lesions  MSK:  moves all extremities  Mental status:  alert, poor memory   Psych:   calm and appropriate interaction               Data:     Recent Labs   Lab 08/03/22  0846 08/03/22  0632 08/03/22  0442 08/03/22  0203 08/02/22  2238 08/02/22  1737   * 124* 121* 140* 305* 295*     Lab Results   Component Value Date    WBC 7.0 07/26/2022    WBC 7.5 07/25/2022    WBC 7.3 07/24/2022    HGB 8.4 (L) 07/26/2022    HGB 8.6 (L) 07/25/2022    HGB 8.7 (L) 07/24/2022    HCT 26.1 (L) 07/26/2022    HCT 27.5 (L) 07/25/2022    HCT 27.1 (L) 07/24/2022    MCV 96 07/26/2022    MCV 96 07/25/2022    MCV 95 07/24/2022     07/30/2022     07/27/2022     07/26/2022     Lab Results   Component Value Date     08/02/2022     07/31/2022     07/26/2022    POTASSIUM 3.9 08/02/2022    POTASSIUM 4.5 07/31/2022    POTASSIUM 4.6 07/26/2022    CHLORIDE 108 08/02/2022    CHLORIDE 98 07/31/2022    CHLORIDE 108 07/26/2022    CO2 28 08/02/2022    CO2 28 07/31/2022    CO2 26 07/26/2022     (H) 08/03/2022     (H) 08/03/2022      (H) 08/03/2022     Lab Results   Component Value Date    BUN 34 (H) 08/02/2022    BUN 30 07/31/2022    BUN 21 07/26/2022     No results found for: TSH  No results found for: AST, ALT, GGT, ALKPHOS    I spent a total of 35 minutes bedside and on the inpatient unit managing the glycemic care of Yaniraog Keene. Over 50% of my time on the unit was spent counseling the patient  and/or coordinating care regarding acute glycemic management and/or discharge planning.  See note for details.      To contact Endocrine Diabetes service:   From 8AM-4PM: page inpatient diabetes provider that is following the patient  For questions or updates from 4PM-8AM: page the diabetes job code for on call fellow: [phone]

## 2022-08-04 NOTE — PROGRESS NOTES
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 IDS signed off 7/25.  Re-called 8/1.      Assessment:   1)Type 1 Diabetes Mellitus, uncontrolled (A1c 10.0)  2) cognitive concerns        Plan:    -if she is ever on IV insulin, initial plan should be to titrate only within algorithm 1    -Tresiba U100 5 units daily Q24h 2000--> reduce to 4 units, move to 1400 admin time   -Novolog   1 per 13 grams bfast , 1:15g CHO coverage with  lunch and snacks/supplements, and  1 per 20 grams supper.   -Novolog medium intensity sliding scale TID AC   --> threshhold for HS and 0200 is 250   -BG monitoring TID AC, HS, 0200   -hypoglycemia protocol   -PRN D5NS at 75 ml/h   -carb counting protocol   -will discharge to TCU, no DM education needs   -unsafe for patient to independently manage /administer her own  insulin.  She would benefit from carb counted Novolog.  She needs insulin EACH time she eats, so estimate in need of supervision at least three times per day.  Unknown if she could manage hypoglycemia on her own.                     -follow up with [facility] as scheduled on 8/4/22--> will need to be rescheduled if discharge delayed beyond appt date/time     -upon discharge, patient will need the following supplies: none needed if discharging to TCU  - son Rell 8/2--> sensors arrived and he will bring the reader next time he comes     Discussed w/ pt, RN, and primary team    Plan for discharge to TCU (unconfirmed carb counting ability at this time)  Diabetes plan for  discharge  Check glucose before meals, bedtime, and 2AM.  WEAR SENSOR for LOW ALARMS  Tresiba 4 units every day (last dose at 8 pm on 8/3)--  Novolog 1 unit per 15 grams of carbohydrate with meals and snacks (last dose was at 9:30 AM on 8/4)  Novolog correction  For Pre-Meal  - 189 give 1 unit.   For Pre-Meal  - 239 give 2 units.   For Pre-Meal  - 289 give 3 units.   For Pre-Meal  - 339 give 4 units.   For Pre-Meal  - 389 give 5 units.   For Pre-Meal  - 439 give 6 units.   For Pre-Meal BG = or > 440 give 7 units.   BEDTIME-    For  - 299 give 1 units.   For  - 349 give 2 units.   For  - 399 give 3 units.   For BG = or > 400 give 4 units.          Interval History and Assessment:      BG improving.  Rossana is mostly able to discuss past hx.     Extra afternoon snack while son visiting yesterday? Causing high evening BG.    Rossana is confused about plans for today.  Thought there was a meeting about TCU, not a discharge.  Son taking her to endocrinology appt today, so needs to leave soon.    Reviewed insulin plan w/ pt and son.  Overnight trending too tight given her hx, so reduce Tresiba.        Recent Labs   Lab 08/04/22  0824 08/04/22  0244 08/03/22  2244 08/03/22  1619 08/03/22  1134 08/03/22  0846   * 116* 178* 281* 190* 149*       Current nutritional intake and type: Orders Placed This Encounter      Combination Diet Regular Diet Adult      Diet    Planned Procedures/surgeries: none  Steroid planning: none  D5W-containing solutions/medications: none    PTA Diabetes Regimen:   Pt reports last glargine 4 units in afternoon, maybe on 7/15/22  Humalog dosing unclear  **waiting on [employer] supplies--< they arrived    Discharge Planning: TCU           Diabetes History:   Type of Diabetes: Type 1 Diabetes Mellitus, with retinopathy, with neuropathy  Lab Results   Component Value Date    A1C 10.0 07/18/2022              Review of Systems:     Pertinent  systems were negative except as noted in history.          Medications:     Current Facility-Administered Medications   Medication     acetaminophen (TYLENOL) tablet 325 mg     aspirin (ASA) chewable tablet 81 mg     benzonatate (TESSALON) capsule 100 mg     dextrose 10% infusion     dextrose 5% and 0.9% NaCl infusion     glucose gel 15-30 g    Or     dextrose 50 % injection 25-50 mL    Or     glucagon injection 1 mg     escitalopram (LEXAPRO) tablet 10 mg     insulin aspart (NovoLOG) injection (RAPID ACTING)     insulin aspart (NovoLOG) injection (RAPID ACTING)     insulin aspart (NovoLOG) injection (RAPID ACTING)     insulin aspart (NovoLOG) injection (RAPID ACTING)     insulin aspart (NovoLOG) injection (RAPID ACTING)     insulin aspart (NovoLOG) injection (RAPID ACTING)     insulin degludec (TRESIBA) 100 UNIT/ML injection 4 Units     ipratropium - albuterol 0.5 mg/2.5 mg/3 mL (DUONEB) neb solution 3 mL     lidocaine (LMX4) cream     lidocaine 1 % 0.1-1 mL     Med Instruction - Transition from IV Insulin Infusion to Sub-Q Insulin     melatonin tablet 1 mg     metoprolol succinate ER (TOPROL XL) 24 hr tablet 25 mg     ondansetron (ZOFRAN ODT) ODT tab 4 mg    Or     ondansetron (ZOFRAN) injection 4 mg     polyethylene glycol (MIRALAX) Packet 17 g     QUEtiapine (SEROquel) quarter-tab 6.25 mg     sodium chloride (PF) 0.9% PF flush 3 mL            Physical Exam:    /66 (BP Location: Right arm)   Pulse 75   Temp 98.7  F (37.1  C) (Oral)   Resp 15   Ht 1.524 m (5')   Wt 49.9 kg (109 lb 14.4 oz)   SpO2 97%   BMI 21.46 kg/m      General: pleasant, in no distress. thin woman. Resting in bed finishing bfast, son at bedside.  HEENT: normocephalic, atraumatic. Oral mucous membranes moist.   Lungs: unlabored respiration, no cough  ABD: rounded  Skin:  dry, no obvious lesions  MSK:  moves all extremities  Mental status:  alert, poor memory   Psych:  anxious              Data:     Recent Labs   Lab 08/04/22  9384  08/04/22  0244 08/03/22  2244 08/03/22  1619 08/03/22  1134 08/03/22  0846   * 116* 178* 281* 190* 149*     Lab Results   Component Value Date    WBC 7.0 07/26/2022    WBC 7.5 07/25/2022    WBC 7.3 07/24/2022    HGB 8.4 (L) 07/26/2022    HGB 8.6 (L) 07/25/2022    HGB 8.7 (L) 07/24/2022    HCT 26.1 (L) 07/26/2022    HCT 27.5 (L) 07/25/2022    HCT 27.1 (L) 07/24/2022    MCV 96 07/26/2022    MCV 96 07/25/2022    MCV 95 07/24/2022     07/30/2022     07/27/2022     07/26/2022     Lab Results   Component Value Date     08/02/2022     07/31/2022     07/26/2022    POTASSIUM 3.9 08/02/2022    POTASSIUM 4.5 07/31/2022    POTASSIUM 4.6 07/26/2022    CHLORIDE 108 08/02/2022    CHLORIDE 98 07/31/2022    CHLORIDE 108 07/26/2022    CO2 28 08/02/2022    CO2 28 07/31/2022    CO2 26 07/26/2022     (H) 08/04/2022     (H) 08/04/2022     (H) 08/03/2022     Lab Results   Component Value Date    BUN 34 (H) 08/02/2022    BUN 30 07/31/2022    BUN 21 07/26/2022     No results found for: TSH  No results found for: AST, ALT, GGT, ALKPHOS    I spent a total of 35 minutes bedside and on the inpatient unit managing the glycemic care of Yaniraog Keene. Over 50% of my time on the unit was spent counseling the patient  and/or coordinating care regarding acute glycemic management and/or discharge planning.  See note for details.      To contact Endocrine Diabetes service:   From 8AM-4PM: page inpatient diabetes provider that is following the patient  For questions or updates from 4PM-8AM: page the diabetes job code for on call fellow: [phone]

## 2022-08-04 NOTE — PLAN OF CARE
Patient is Aox4. Able to make needs known. Forgetful and sometimes disoriented to place. Baseline N/T in all extremities. Assist of 1 with gait belt and walker. Mix of continent and incontinent. LBM 8/4. On sliding scale and carb count insulin coverage. No insulin given for overnight period. Able to take pills whole without issue. Pending transfer to TCU.

## 2022-08-04 NOTE — PLAN OF CARE
Occupational Therapy Discharge Summary    Reason for therapy discharge:    Discharged to transitional care facility.    Progress towards therapy goal(s). See goals on Care Plan in [facility] electronic health record for goal details.  Goals partially met.  Barriers to achieving goals:   discharge from facility.    Therapy recommendation(s):    Due to current cognitive impairment and recent fall/moblity concerns,  patient is not safe to return home to independent living or driving. Recommend TCU/LTC at this time, would likely benefit from transition to [facility] or memory care. Pt would benefit from neuro psych eval to further assess cognition.

## 2022-08-04 NOTE — PLAN OF CARE
Physical Therapy Discharge Summary    Reason for therapy discharge:    Discharged to transitional care facility.    Progress towards therapy goal(s). See goals on Care Plan in [facility] electronic health record for goal details.  Goals partially met.  Barriers to achieving goals:   limited tolerance for therapy.    Therapy recommendation(s):    Continued therapy is recommended.  Rationale/Recommendations:  to progress safety and independence with functional mobility.

## 2022-08-04 NOTE — PLAN OF CARE
Pt discharged w/belongings (clothes, cane, randee glucometer), transported by family to TCU. Discharge packet and information given, questions answered. IV access removed.

## 2022-08-04 NOTE — DISCHARGE SUMMARY
Discharge Summary    Yanira Keene MRN# [id number]   YOB: 1945 Age: 77 year old     Date of Admission:  7/17/2022  Date of Discharge:  8/4/2022  Admitting Physician:  Sterling Hanson MD  Discharge Physician:  Terrence Daley MD   Discharging Service:  Internal Medicine     Primary Provider: [facility]          Discharge Diagnosis:   Hospital Acquired Pneumonia  DM1 complicated by neuropathy and retinopathy  MDD  CAD s/p stent to LAD  Cognitive impairment  # Probable Toxic, Metabolic encephalopathy d/t  infection, metabolic abnormality  Euvolemic Hyponatremia  Probable FELIPE on admission likely pre renal  Moderate Malnutrition  Anemia of chronic disease              Brief History of Illness:     Yanira Keene is a 77 year old female who was admitted for pneumonia and DM 2    For more details, please refer to the admission H&P on 7/17/2022             Hospital Course:     77 year old female with a history of Cognitive disorder, CAD, DM1 c/b retinopathy and neuropathy, MDD admitted on 7/17/2022. She presents for cough and high blood sugar found to have pneumonia and hyperglycemia from not taking her insulin. Given her cognitive disorder and concerns for ability to care for herself, it was recommended she be admitted for treatment of pneumonia and optimization of blood sugar with PT/OT evaluations.     # Hospital Acquired Pneumonia:   - COVID test here negative, at home RAT was positive >1 week ago (when daughter was still in town, but patient is unsure of exact date).   Required 1-2 liters intermittently on 07/19, on RA on 07/20  On Room air   - Completed a 5 day treatment of Cefepime         # DM1 complicated by neuropathy and retinopathy:   Endocrinology was consulted through out hospitalization. Appreciate their management.   Patient was stabilized on a regimen, but this had to be revisited again due to significantly fluctuating blood sugars. She was placed on  Insulin infusion, and transitioned to subcutaneous Insulin.   Case was discussed with Endocrinology every day including discharge day.   Discharge regimen as follows:  - Insulin Tresiba 4 units daily  - Insulin Aspart 1: 15 gm CHO before meals, and snacks  - Inslulin Aspart medium intensity correction  - Check Blood glucose before meals and bedtime  - Follow up with Endocrinology as outpatient  - Patient to wear sensor for monitoring    # MDD  - continue lexapro     # CAD s/p stent to LAD  PTA on Aspirin, and Metoprolol XL.   - Continue       # Cognitive impairment  # Probable Toxic, Metabolic encephalopathy d/t  infection, metabolic abnormality  - concerns for ability to care for herself given complexity of DM1 and forgetfulness  - PT /OT consulted  -  Low dose seroquel added ( 65.25 mg at bed time)   - Delirium prevention measures  - Treat infection, hyperglycemia  - Therapy recommending TCU          # Euvolemic Hyponatremia, Resolved   - likely SIADH in the setting of pneumonia  Na level normal on 07/19/2022     # Probable FELIPE on admission likely pre renal  Creatinine 1.25 on admit, which improved to 0.7 on 07/19  Renal function has remained stable since        # Moderate Malnutrition  - nutrition was  Consulted. Appreciate recommendations.      # Anemia of chronic disease  Hg 8.4 gm/dl on 07/26/2022          Diet: Combination Diet Regular Diet Adult    DVT Prophylaxis: Enoxaparin (Lovenox) SQ  Billy Catheter: Not present  Central Lines: None  Cardiac Monitoring: None  Code Status: No CPR- Do NOT Intubate             Discharge Medications:     Current Discharge Medication List      START taking these medications    Details   !! insulin aspart (NOVOLOG PEN) 100 UNIT/ML pen Infect 1 units per 13 grams of carbohydate with breakfast  If given at mealtime, administer within 30 minutes of start of meal.  Qty: 15 mL    Associated Diagnoses: Long term current use of insulin (H)      !! insulin aspart (NOVOLOG PEN) 100  UNIT/ML pen Inject 1 units per 15 grams of carbohydrate with Lunch  If given at mealtime, administer within 30 minutes of start of meal.  Qty: 15 mL    Associated Diagnoses: Long term current use of insulin (H)      !! insulin aspart (NOVOLOG PEN) 100 UNIT/ML pen Inject 1 units per 20 grams of carbohydrate with supper   If given at mealtime, administer within 30 minutes of start of meal.  Qty: 15 mL    Associated Diagnoses: Long term current use of insulin (H)      !! insulin aspart (NOVOLOG PEN) 100 UNIT/ML pen Inject 1-7 Units Subcutaneous 3 times daily (with meals) Correction Scale - MEDIUM INSULIN RESISTANCE DOSING     Do Not give Correction Insulin if Pre-Meal BG less than 140.   For Pre-Meal  - 189 give 1 unit.   For Pre-Meal  - 239 give 2 units.   For Pre-Meal  - 289 give 3 units.   For Pre-Meal  - 339 give 4 units.   For Pre-Meal - 399 give 5 units.   For Pre-Meal -449 give 6 units  For Pre-Meal BG greater than or equal to 450 give 7 units.   To be given with prandial insulin, and based on pre-meal blood glucose.    Notify provider if glucose greater than or equal to 350 mg/dL after administration of correction dose.  If given at mealtime, administer within 30 minutes of start of meal.  Qty: 15 mL    Associated Diagnoses: Long term current use of insulin (H)      !! insulin aspart (NOVOLOG PEN) 100 UNIT/ML pen Inject 1 units per 15 grams of carbohydate with snacks or supplements  If given at mealtime, administer within 30 minutes of start of meal.  Qty: 15 mL    Associated Diagnoses: Long term current use of insulin (H)      !! insulin aspart (NOVOLOG PEN) 100 UNIT/ML pen Inject 1-4 Units Subcutaneous 2 times daily Do not give if patient has received correction within the preceding 4 hours.  Admin schedule: 2200 and 0200.  Custom INSULIN RESISTANCE DOSING    Do Not give Correction Insulin if BG less than  250.     For  - 299 give 1 units.   For  - 349 give 2  units.   For  -399 give 3 units.   For BG greater than or equal to 400 give 4 units.  Notify provider if glucose greater than or equal to 350 mg/dL after administration of correction dose.  If given at mealtime, administer within 30 minutes of start of meal.  Qty: 15 mL    Associated Diagnoses: Long term current use of insulin (H)      insulin degludec (TRESIBA) 100 UNIT/ML pen Inject 5 Units Subcutaneous every 24 hours Every 24 hours at 8 PM  Qty: 15 mL    Associated Diagnoses: Long term current use of insulin (H)      ipratropium - albuterol 0.5 mg/2.5 mg/3 mL (DUONEB) 0.5-2.5 (3) MG/3ML neb solution Take 1 vial (3 mLs) by nebulization every 4 hours as needed for wheezing  Qty: 90 mL    Associated Diagnoses: Pneumonia of right lower lobe due to infectious organism      polyethylene glycol (MIRALAX) 17 GM/Dose powder Take 17 g by mouth daily  Qty: 510 g    Associated Diagnoses: Long term current use of insulin (H)      QUEtiapine (SEROQUEL) 25 MG tablet Take 0.25 tablets (6.25 mg) by mouth At Bedtime    Associated Diagnoses: Confusion       !! - Potential duplicate medications found. Please discuss with provider.      CONTINUE these medications which have NOT CHANGED    Details   aspirin (ASA) 81 MG chewable tablet Take 81 mg by mouth daily      escitalopram (LEXAPRO) 10 MG tablet Take 1 tablet by mouth daily      metoprolol succinate ER (TOPROL XL) 25 MG 24 hr tablet Take 25 mg by mouth daily Did not start taking         STOP taking these medications       insulin glargine (LANTUS PEN) 100 UNIT/ML pen Comments:   Reason for Stopping:         insulin lispro (HUMALOG KWIKPEN) 100 UNIT/ML (1 unit dial) KWIKPEN Comments:   Reason for Stopping:                   Procedures/Consultations:   No procedures performed during this admission  Consultation during this admission received from Endocrinology, PT/OT           Final Day of Progress before Discharge:     Reports doing well  No nausea, vomiting    Physical  Exam:  Blood pressure 125/66, pulse 75, temperature 98.7  F (37.1  C), temperature source Oral, resp. rate 15, height 1.524 m (5'), weight 49.9 kg (109 lb 14.4 oz), SpO2 97 %.      General: Laying in bed in no acute distress  HEENT: no icterus ,no pallor  CVS/Chest: S1, S2 normal.   Respiratory/Chest: B/L CTA  GI/Abdomen/: Soft, NT, BS+  Neuro: Alert, awake, and oriented to time, place. person  Extremities:  Others:    Data:  All laboratory data reviewed             Condition on Discharge:   Discharge condition: Stable   Code status on discharge: DNR / DNI                Discharge Disposition:   Discharged to rehabilitation facility               Pending Results:   Unresulted Labs Ordered in the Past 30 Days of this Admission     No orders found from 6/17/2022 to 7/18/2022.                  Discharge Instructions and Follow-Up:     Discharge Procedure Orders   General info for SNF   Order Comments: Length of Stay Estimate: Short Term Care: Estimated # of Days 31-90  Condition at Discharge: Stable  Level of care:skilled   Rehabilitation Potential: Fair  Admission H&P remains valid and up-to-date: Yes  Recent Chemotherapy: N/A  Use Nursing Home Standing Orders: Yes     Mantoux instructions   Order Comments: Give two-step Mantoux (PPD) Per Facility Policy Yes     Follow Up and recommended labs and tests   Order Comments: Follow up with primary care or NH physician in one week. Check CBC, CMP  Follow up with [facility] in 1-2 weeks     Reason for your hospital stay   Order Comments: Admitted for pneumonia, encephalopathy, and hyperglycemia     Glucose monitor nursing POCT   Order Comments: BG monitoring TID AC, HS, 0200     Intake and output   Order Comments: Every shift     Activity - Up with nursing assistance     Order Specific Question Answer Comments   Is discharge order? Yes      Physical Therapy Adult Consult   Order Comments: Evaluate and treat as clinically indicated.    Reason:  Deconditoning      Occupational Therapy Adult Consult   Order Comments: Evaluate and treat as clinically indicated.    Reason:  Deconditioning     Diet   Order Comments: Follow this diet upon discharge: Orders Placed This Encounter      Snacks/Supplements Adult: Ensure Enlive; With Meals      Combination Diet Regular Diet Adult     Order Specific Question Answer Comments   Is discharge order? Yes           Attestation:  Terrence Daley MD.    Time spent on patient: 55 minutes total including face to face and coordinating care time reviewing current illness, any medication changes, and the care plan for today.

## 2022-08-04 NOTE — PROGRESS NOTES
SW was notified that patient did not go to the TCU. Per admissions, her son stated that patient did not want to go to the TCU so he brought her home. SW submitted a Vulnerable Adult report. Writer notified patient's PCP- Deena Eric through external hand off.       TWILA Bautista RNCC  RN Care Coordinator   Office: [phone]   Pager: [phone]